# Patient Record
Sex: FEMALE | Race: WHITE | NOT HISPANIC OR LATINO | Employment: OTHER | URBAN - METROPOLITAN AREA
[De-identification: names, ages, dates, MRNs, and addresses within clinical notes are randomized per-mention and may not be internally consistent; named-entity substitution may affect disease eponyms.]

---

## 2018-09-14 ENCOUNTER — HOSPITAL ENCOUNTER (EMERGENCY)
Facility: HOSPITAL | Age: 58
Discharge: HOME | End: 2018-09-14
Attending: EMERGENCY MEDICINE | Admitting: EMERGENCY MEDICINE

## 2018-09-14 VITALS
WEIGHT: 130 LBS | SYSTOLIC BLOOD PRESSURE: 144 MMHG | HEART RATE: 78 BPM | DIASTOLIC BLOOD PRESSURE: 78 MMHG | OXYGEN SATURATION: 99 % | BODY MASS INDEX: 24.55 KG/M2 | RESPIRATION RATE: 16 BRPM | TEMPERATURE: 97.9 F | HEIGHT: 61 IN

## 2018-09-14 DIAGNOSIS — K13.0 ANGULAR CHEILOSIS: Primary | ICD-10-CM

## 2018-09-14 DIAGNOSIS — K14.0 GLOSSITIS: ICD-10-CM

## 2018-09-14 PROCEDURE — 99283 EMERGENCY DEPT VISIT LOW MDM: CPT

## 2018-09-14 RX ORDER — CLOTRIMAZOLE 10 MG/1
10 LOZENGE ORAL; TOPICAL
Qty: 50 TABLET | Refills: 0 | Status: ON HOLD | OUTPATIENT
Start: 2018-09-14 | End: 2021-07-01

## 2018-09-14 NOTE — ED PROVIDER NOTES
Subjective   Patient's been complaining of her tongue burning on both sides and in the back going on for 3 months.  Patient reports that she was initially given steroids which cause some white plaques in her mouth.  Patient states this was followed up with given some nystatin swish and spit which seemed to help a little bit and was given Diflucan to be taken every other day for 3 doses.  Patient reports this continues to come back.  She seen by her primary care doctor twice.  She was seen by a dermatologist for a possible biopsy which the dermatologist did not feel like she needed.  The dermatologist and sent her to an ear nose and throat doctor in the ENT due to some type of a swab for a fungal infection which will not be back for 6 weeks.  Patient reports that nothing seems to exacerbate the burning sensation she sometimes get this in the corners of her mouth as well.  Patient states that that I rechecked her B12 have done basic lab work which was all come back normal.  Her past history significant for a very superficial melanoma that was excised months ago.  Patient reports that that she has no other chronic medical disease problems.  Patient's that she's not had any bleeding of the tongue.  Said did not change her ability to taste.  She's had no injury to the mouth or to the head that she can recall.  Patient denies any fevers chills or rigors associated with this.  Patient does report the symptoms are severe enough that sometimes keeps her awake at night.  She did have one of the physicians that should seen suggested that she's become fixated on this but did not offer any other medications for this.  I think it seems to alleviate her discomfort is the nystatin swish and spit.  This is bilateral not unilateral.  She is had discomfort into her teeth but no specific tooth is tender.  She's also seen her dentist which did not feel like this was due to any other source other than taking too many antibiotics.   Sensation is a burning sensation.  States it is not radiate.  Patient does report cheilosis.        History provided by:  Patient and spouse   used: No        Review of Systems   Constitutional: Negative for chills and fever.   Respiratory: Negative for chest tightness, shortness of breath and wheezing.    Hematological: Negative for adenopathy.   Psychiatric/Behavioral: Negative.    All other systems reviewed and are negative.      Past Medical History:   Diagnosis Date   • Skin cancer        No Known Allergies    History reviewed. No pertinent surgical history.    History reviewed. No pertinent family history.    Social History     Social History   • Marital status:      Social History Main Topics   • Smoking status: Never Smoker   • Alcohol use No   • Drug use: No     Other Topics Concern   • Not on file           Objective   Physical Exam   Constitutional: She is oriented to person, place, and time. She appears well-developed and well-nourished.   HENT:   Head: Normocephalic and atraumatic.   Right Ear: External ear normal.   Left Ear: External ear normal.   Nose: Nose normal.   Mouth/Throat: Oropharynx is clear and moist.   Tongue appeared to be normal.  There is no glossitis that I can see.  Did not see any white patches at this time.   Eyes: Conjunctivae are normal. No scleral icterus.   Neck: Normal range of motion. No thyromegaly present.   Pulmonary/Chest: No respiratory distress.   Musculoskeletal: Normal range of motion.   Lymphadenopathy:     She has no cervical adenopathy.   Neurological: She is alert and oriented to person, place, and time. She has normal reflexes. She displays normal reflexes. No cranial nerve deficit. Coordination normal.   Skin: Skin is warm and dry.   Psychiatric: She has a normal mood and affect. Her behavior is normal. Judgment and thought content normal.   Nursing note and vitals reviewed.      Procedures           ED Course  ED Course as of Sep 15  2349   Fri Sep 14, 2018   0851 Patient did say that the oral asked that seemed to help afterwards it would seem to return.  Road try some oral clotrimazole and Magic mouthwash.  We'll for her to ENT on call Dr. Arshad.  [DAVID]      ED Course User Index  [DAVID] Wiley Lopez PA                  MDM  Number of Diagnoses or Management Options  Angular cheilosis: new and requires workup  Glossitis: new and requires workup     Amount and/or Complexity of Data Reviewed  Discuss the patient with other providers: yes    Patient Progress  Patient progress: stable        Final diagnoses:   Angular cheilosis   Glossitis            Wiley Lopez PA  09/15/18 0225

## 2021-03-16 ENCOUNTER — IMMUNIZATION (OUTPATIENT)
Dept: VACCINE CLINIC | Facility: HOSPITAL | Age: 61
End: 2021-03-16

## 2021-03-16 PROCEDURE — 91300 HC SARSCOV02 VAC 30MCG/0.3ML IM: CPT | Performed by: INTERNAL MEDICINE

## 2021-03-16 PROCEDURE — 0001A: CPT | Performed by: INTERNAL MEDICINE

## 2021-04-06 ENCOUNTER — IMMUNIZATION (OUTPATIENT)
Dept: VACCINE CLINIC | Facility: HOSPITAL | Age: 61
End: 2021-04-06

## 2021-04-06 PROCEDURE — 0002A: CPT | Performed by: INTERNAL MEDICINE

## 2021-04-06 PROCEDURE — 91300 HC SARSCOV02 VAC 30MCG/0.3ML IM: CPT | Performed by: INTERNAL MEDICINE

## 2021-07-01 ENCOUNTER — APPOINTMENT (OUTPATIENT)
Dept: GENERAL RADIOLOGY | Facility: HOSPITAL | Age: 61
End: 2021-07-01

## 2021-07-01 ENCOUNTER — APPOINTMENT (OUTPATIENT)
Dept: CT IMAGING | Facility: HOSPITAL | Age: 61
End: 2021-07-01

## 2021-07-01 ENCOUNTER — HOSPITAL ENCOUNTER (INPATIENT)
Facility: HOSPITAL | Age: 61
LOS: 2 days | Discharge: HOME | End: 2021-07-03
Attending: EMERGENCY MEDICINE | Admitting: INTERNAL MEDICINE

## 2021-07-01 DIAGNOSIS — R19.00 PELVIC MASS: ICD-10-CM

## 2021-07-01 DIAGNOSIS — D69.6 THROMBOCYTOPENIA (HCC): ICD-10-CM

## 2021-07-01 DIAGNOSIS — D72.819 LEUKOPENIA, UNSPECIFIED TYPE: Primary | ICD-10-CM
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
## 2021-07-01 LAB
ALBUMIN SERPL-MCNC: 4.01 G/DL (ref 3.5–5.2)
ALBUMIN/GLOB SERPL: 1.3 G/DL
ALP SERPL-CCNC: 84 U/L (ref 39–117)
ALT SERPL W P-5'-P-CCNC: 58 U/L (ref 1–33)
ANION GAP SERPL CALCULATED.3IONS-SCNC: 10.6 MMOL/L (ref 5–15)
AST SERPL-CCNC: 80 U/L (ref 1–32)
BACTERIA UR QL AUTO: ABNORMAL /HPF
BASOPHILS # BLD MANUAL: 0.03 10*3/MM3 (ref 0–0.2)
BASOPHILS NFR BLD AUTO: 1 % (ref 0–1.5)
BILIRUB SERPL-MCNC: 0.8 MG/DL (ref 0–1.2)
BILIRUB UR QL STRIP: ABNORMAL
BUN SERPL-MCNC: 12 MG/DL (ref 8–23)
BUN/CREAT SERPL: 14 (ref 7–25)
CALCIUM SPEC-SCNC: 9.2 MG/DL (ref 8.6–10.5)
CHLORIDE SERPL-SCNC: 95 MMOL/L (ref 98–107)
CLARITY UR: CLEAR
CO2 SERPL-SCNC: 25.4 MMOL/L (ref 22–29)
COLOR UR: ABNORMAL
CREAT SERPL-MCNC: 0.86 MG/DL (ref 0.57–1)
CRP SERPL-MCNC: 4.22 MG/DL (ref 0–0.5)
D-LACTATE SERPL-SCNC: 1.5 MMOL/L (ref 0.5–2)
DEPRECATED RDW RBC AUTO: 41.9 FL (ref 37–54)
ERYTHROCYTE [DISTWIDTH] IN BLOOD BY AUTOMATED COUNT: 13.4 % (ref 12.3–15.4)
FLUAV RNA RESP QL NAA+PROBE: NOT DETECTED
FLUBV RNA RESP QL NAA+PROBE: NOT DETECTED
GFR SERPL CREATININE-BSD FRML MDRD: 67 ML/MIN/1.73
GLOBULIN UR ELPH-MCNC: 3.1 GM/DL
GLUCOSE SERPL-MCNC: 113 MG/DL (ref 65–99)
GLUCOSE UR STRIP-MCNC: NEGATIVE MG/DL
HBA1C MFR BLD: 6.2 % (ref 4.8–5.6)
HCT VFR BLD AUTO: 40.3 % (ref 34–46.6)
HGB BLD-MCNC: 13.2 G/DL (ref 12–15.9)
HGB UR QL STRIP.AUTO: NEGATIVE
HYALINE CASTS UR QL AUTO: ABNORMAL /LPF
KETONES UR QL STRIP: ABNORMAL
LEUKOCYTE ESTERASE UR QL STRIP.AUTO: ABNORMAL
LYMPHOCYTES # BLD MANUAL: 0.36 10*3/MM3 (ref 0.7–3.1)
LYMPHOCYTES NFR BLD MANUAL: 10 % (ref 5–12)
LYMPHOCYTES NFR BLD MANUAL: 14 % (ref 19.6–45.3)
MAGNESIUM SERPL-MCNC: 1.9 MG/DL (ref 1.6–2.4)
MCH RBC QN AUTO: 28.1 PG (ref 26.6–33)
MCHC RBC AUTO-ENTMCNC: 32.8 G/DL (ref 31.5–35.7)
MCV RBC AUTO: 85.9 FL (ref 79–97)
METAMYELOCYTES NFR BLD MANUAL: 1 % (ref 0–0)
MONOCYTES # BLD AUTO: 0.26 10*3/MM3 (ref 0.1–0.9)
MUCOUS THREADS URNS QL MICRO: ABNORMAL /HPF
MYELOCYTES NFR BLD MANUAL: 2 % (ref 0–0)
NEUTROPHILS # BLD AUTO: 1.87 10*3/MM3 (ref 1.7–7)
NEUTROPHILS NFR BLD MANUAL: 60 % (ref 42.7–76)
NEUTS BAND NFR BLD MANUAL: 12 % (ref 0–5)
NITRITE UR QL STRIP: NEGATIVE
PH UR STRIP.AUTO: 6 [PH] (ref 5–8)
PLAT MORPH BLD: NORMAL
PLATELET # BLD AUTO: 76 10*3/MM3 (ref 140–450)
PMV BLD AUTO: 12.4 FL (ref 6–12)
POTASSIUM SERPL-SCNC: 3.1 MMOL/L (ref 3.5–5.2)
PROT SERPL-MCNC: 7.1 G/DL (ref 6–8.5)
PROT UR QL STRIP: ABNORMAL
RBC # BLD AUTO: 4.69 10*6/MM3 (ref 3.77–5.28)
RBC # UR: ABNORMAL /HPF
RBC MORPH BLD: NORMAL
REF LAB TEST METHOD: ABNORMAL
SARS-COV-2 RNA RESP QL NAA+PROBE: NOT DETECTED
SODIUM SERPL-SCNC: 131 MMOL/L (ref 136–145)
SP GR UR STRIP: 1.02 (ref 1–1.03)
SQUAMOUS #/AREA URNS HPF: ABNORMAL /HPF
TSH SERPL DL<=0.05 MIU/L-ACNC: 1.25 UIU/ML (ref 0.27–4.2)
UROBILINOGEN UR QL STRIP: ABNORMAL
WBC # BLD AUTO: 2.6 10*3/MM3 (ref 3.4–10.8)
WBC UR QL AUTO: ABNORMAL /HPF

## 2021-07-01 PROCEDURE — 87040 BLOOD CULTURE FOR BACTERIA: CPT | Performed by: EMERGENCY MEDICINE

## 2021-07-01 PROCEDURE — 85025 COMPLETE CBC W/AUTO DIFF WBC: CPT | Performed by: EMERGENCY MEDICINE

## 2021-07-01 PROCEDURE — 87636 SARSCOV2 & INF A&B AMP PRB: CPT | Performed by: PHYSICIAN ASSISTANT

## 2021-07-01 PROCEDURE — 85007 BL SMEAR W/DIFF WBC COUNT: CPT | Performed by: EMERGENCY MEDICINE

## 2021-07-01 PROCEDURE — 83036 HEMOGLOBIN GLYCOSYLATED A1C: CPT | Performed by: PHYSICIAN ASSISTANT

## 2021-07-01 PROCEDURE — 99223 1ST HOSP IP/OBS HIGH 75: CPT | Performed by: PHYSICIAN ASSISTANT

## 2021-07-01 PROCEDURE — 86753 PROTOZOA ANTIBODY NOS: CPT | Performed by: PHYSICIAN ASSISTANT

## 2021-07-01 PROCEDURE — 74176 CT ABD & PELVIS W/O CONTRAST: CPT

## 2021-07-01 PROCEDURE — 83605 ASSAY OF LACTIC ACID: CPT | Performed by: EMERGENCY MEDICINE

## 2021-07-01 PROCEDURE — 84443 ASSAY THYROID STIM HORMONE: CPT | Performed by: PHYSICIAN ASSISTANT

## 2021-07-01 PROCEDURE — 84145 PROCALCITONIN (PCT): CPT | Performed by: PHYSICIAN ASSISTANT

## 2021-07-01 PROCEDURE — 86666 EHRLICHIA ANTIBODY: CPT | Performed by: PHYSICIAN ASSISTANT

## 2021-07-01 PROCEDURE — 99285 EMERGENCY DEPT VISIT HI MDM: CPT

## 2021-07-01 PROCEDURE — 86618 LYME DISEASE ANTIBODY: CPT | Performed by: PHYSICIAN ASSISTANT

## 2021-07-01 PROCEDURE — 80053 COMPREHEN METABOLIC PANEL: CPT | Performed by: EMERGENCY MEDICINE

## 2021-07-01 PROCEDURE — 81001 URINALYSIS AUTO W/SCOPE: CPT | Performed by: EMERGENCY MEDICINE

## 2021-07-01 PROCEDURE — 25010000002 CEFTRIAXONE PER 250 MG: Performed by: EMERGENCY MEDICINE

## 2021-07-01 PROCEDURE — 71046 X-RAY EXAM CHEST 2 VIEWS: CPT

## 2021-07-01 PROCEDURE — 86757 RICKETTSIA ANTIBODY: CPT | Performed by: PHYSICIAN ASSISTANT

## 2021-07-01 PROCEDURE — 85060 BLOOD SMEAR INTERPRETATION: CPT | Performed by: PHYSICIAN ASSISTANT

## 2021-07-01 PROCEDURE — 71046 X-RAY EXAM CHEST 2 VIEWS: CPT | Performed by: RADIOLOGY

## 2021-07-01 PROCEDURE — 74176 CT ABD & PELVIS W/O CONTRAST: CPT | Performed by: RADIOLOGY

## 2021-07-01 PROCEDURE — 87086 URINE CULTURE/COLONY COUNT: CPT | Performed by: EMERGENCY MEDICINE

## 2021-07-01 PROCEDURE — 83735 ASSAY OF MAGNESIUM: CPT | Performed by: PHYSICIAN ASSISTANT

## 2021-07-01 PROCEDURE — 86140 C-REACTIVE PROTEIN: CPT | Performed by: PHYSICIAN ASSISTANT

## 2021-07-01 RX ORDER — SODIUM CHLORIDE 0.9 % (FLUSH) 0.9 %
10 SYRINGE (ML) INJECTION AS NEEDED
Status: DISCONTINUED | OUTPATIENT
Start: 2021-07-01 | End: 2021-07-03 | Stop reason: HOSPADM

## 2021-07-01 RX ORDER — DOXYCYCLINE 100 MG/1
100 CAPSULE ORAL ONCE
Status: COMPLETED | OUTPATIENT
Start: 2021-07-01 | End: 2021-07-01

## 2021-07-01 RX ORDER — SODIUM CHLORIDE 9 MG/ML
75 INJECTION, SOLUTION INTRAVENOUS CONTINUOUS
Status: DISCONTINUED | OUTPATIENT
Start: 2021-07-01 | End: 2021-07-03 | Stop reason: HOSPADM

## 2021-07-01 RX ORDER — CHOLECALCIFEROL (VITAMIN D3) 125 MCG
5 CAPSULE ORAL NIGHTLY PRN
Status: DISCONTINUED | OUTPATIENT
Start: 2021-07-01 | End: 2021-07-03 | Stop reason: HOSPADM

## 2021-07-01 RX ORDER — POTASSIUM CHLORIDE 20 MEQ/1
40 TABLET, EXTENDED RELEASE ORAL DAILY
Status: DISCONTINUED | OUTPATIENT
Start: 2021-07-01 | End: 2021-07-01

## 2021-07-01 RX ORDER — ALPRAZOLAM 0.5 MG/1
0.5 TABLET ORAL 2 TIMES DAILY PRN
COMMUNITY

## 2021-07-01 RX ORDER — ACETAMINOPHEN 325 MG/1
650 TABLET ORAL EVERY 6 HOURS PRN
Status: DISCONTINUED | OUTPATIENT
Start: 2021-07-01 | End: 2021-07-03 | Stop reason: HOSPADM

## 2021-07-01 RX ORDER — CEFDINIR 300 MG/1
300 CAPSULE ORAL 2 TIMES DAILY
COMMUNITY
End: 2021-07-03 | Stop reason: HOSPADM

## 2021-07-01 RX ORDER — PANTOPRAZOLE SODIUM 40 MG/1
40 TABLET, DELAYED RELEASE ORAL
Status: DISCONTINUED | OUTPATIENT
Start: 2021-07-02 | End: 2021-07-03 | Stop reason: HOSPADM

## 2021-07-01 RX ORDER — ROSUVASTATIN CALCIUM 10 MG/1
10 TABLET, COATED ORAL NIGHTLY
COMMUNITY

## 2021-07-01 RX ORDER — SODIUM CHLORIDE 0.9 % (FLUSH) 0.9 %
10 SYRINGE (ML) INJECTION EVERY 12 HOURS SCHEDULED
Status: DISCONTINUED | OUTPATIENT
Start: 2021-07-01 | End: 2021-07-03 | Stop reason: HOSPADM

## 2021-07-01 RX ORDER — IBUPROFEN 600 MG/1
600 TABLET ORAL EVERY 8 HOURS PRN
COMMUNITY

## 2021-07-01 RX ORDER — PANTOPRAZOLE SODIUM 40 MG/1
40 TABLET, DELAYED RELEASE ORAL DAILY
COMMUNITY

## 2021-07-01 RX ORDER — BACLOFEN 10 MG/1
10 TABLET ORAL 2 TIMES DAILY PRN
COMMUNITY

## 2021-07-01 RX ORDER — NITROGLYCERIN 0.4 MG/1
0.4 TABLET SUBLINGUAL
Status: DISCONTINUED | OUTPATIENT
Start: 2021-07-01 | End: 2021-07-03 | Stop reason: HOSPADM

## 2021-07-01 RX ORDER — L.ACID,PARA/B.BIFIDUM/S.THERM 8B CELL
1 CAPSULE ORAL DAILY
Status: DISCONTINUED | OUTPATIENT
Start: 2021-07-01 | End: 2021-07-03 | Stop reason: HOSPADM

## 2021-07-01 RX ADMIN — DOXYCYCLINE 100 MG: 100 CAPSULE ORAL at 17:09

## 2021-07-01 RX ADMIN — ACETAMINOPHEN 650 MG: 325 TABLET ORAL at 19:34

## 2021-07-01 RX ADMIN — SODIUM CHLORIDE 75 ML/HR: 9 INJECTION, SOLUTION INTRAVENOUS at 19:27

## 2021-07-01 RX ADMIN — CEFTRIAXONE 1 G: 1 INJECTION, POWDER, FOR SOLUTION INTRAMUSCULAR; INTRAVENOUS at 16:11

## 2021-07-01 RX ADMIN — SODIUM CHLORIDE 1000 ML: 9 INJECTION, SOLUTION INTRAVENOUS at 09:40

## 2021-07-01 RX ADMIN — POTASSIUM CHLORIDE 40 MEQ: 20 TABLET, EXTENDED RELEASE ORAL at 12:25

## 2021-07-01 NOTE — ED PROVIDER NOTES
"Subjective   60-year-old white female complains of being \"sick\".  Patient states that 5 days ago she began to have symptoms of \"hot flashes\" intermittently.  She also complains of diffuse myalgias, cough with scant sputum production.  She also complained of dysuria and said that her urine appeared \"copper colored\" yesterday and this morning.  She saw immediate care yesterday and got prescription for cefdinir for UTI.  However, today her symptoms continued.  She has had decreased appetite and decreased fluid intake and complains of generalized weakness and malaise.  She denies any medicine allergies, tobacco, alcohol or drug use.  Patient reports tick bite on her left ankle 3 weeks ago.          Review of Systems   All other systems reviewed and are negative.      Past Medical History:   Diagnosis Date   • Skin cancer        No Known Allergies    No past surgical history on file.    No family history on file.    Social History     Socioeconomic History   • Marital status:      Spouse name: Not on file   • Number of children: Not on file   • Years of education: Not on file   • Highest education level: Not on file   Tobacco Use   • Smoking status: Never Smoker   Substance and Sexual Activity   • Alcohol use: No   • Drug use: No           Objective   Physical Exam  Vitals and nursing note reviewed. Exam conducted with a chaperone present (Priscilla).   Constitutional:       Appearance: Normal appearance. She is normal weight.   HENT:      Head: Normocephalic and atraumatic.      Mouth/Throat:      Mouth: Mucous membranes are moist.      Pharynx: Oropharynx is clear.   Cardiovascular:      Rate and Rhythm: Normal rate and regular rhythm.      Heart sounds: Normal heart sounds. No murmur heard.   No friction rub. No gallop.    Pulmonary:      Effort: Pulmonary effort is normal. No respiratory distress.      Breath sounds: Normal breath sounds. No wheezing, rhonchi or rales.   Abdominal:      General: Abdomen is flat. " Bowel sounds are normal. There is no distension.      Palpations: Abdomen is soft.      Tenderness: There is no abdominal tenderness. There is no guarding.      Hernia: No hernia is present.   Musculoskeletal:         General: Normal range of motion.   Skin:     General: Skin is warm and dry.      Comments: No signs of tick bite at this time.   Neurological:      General: No focal deficit present.      Mental Status: She is alert and oriented to person, place, and time.   Psychiatric:         Mood and Affect: Mood normal.         Behavior: Behavior normal.         Procedures           ED Course      Results for orders placed or performed during the hospital encounter of 07/01/21   Comprehensive Metabolic Panel    Specimen: Blood   Result Value Ref Range    Glucose 113 (H) 65 - 99 mg/dL    BUN 12 8 - 23 mg/dL    Creatinine 0.86 0.57 - 1.00 mg/dL    Sodium 131 (L) 136 - 145 mmol/L    Potassium 3.1 (L) 3.5 - 5.2 mmol/L    Chloride 95 (L) 98 - 107 mmol/L    CO2 25.4 22.0 - 29.0 mmol/L    Calcium 9.2 8.6 - 10.5 mg/dL    Total Protein 7.1 6.0 - 8.5 g/dL    Albumin 4.01 3.50 - 5.20 g/dL    ALT (SGPT) 58 (H) 1 - 33 U/L    AST (SGOT) 80 (H) 1 - 32 U/L    Alkaline Phosphatase 84 39 - 117 U/L    Total Bilirubin 0.8 0.0 - 1.2 mg/dL    eGFR Non African Amer 67 >60 mL/min/1.73    Globulin 3.1 gm/dL    A/G Ratio 1.3 g/dL    BUN/Creatinine Ratio 14.0 7.0 - 25.0    Anion Gap 10.6 5.0 - 15.0 mmol/L   Urinalysis With Culture If Indicated - Urine, Clean Catch    Specimen: Urine, Clean Catch   Result Value Ref Range    Color, UA Dark Yellow (A) Yellow, Straw    Appearance, UA Clear Clear    pH, UA 6.0 5.0 - 8.0    Specific Gravity, UA 1.024 1.005 - 1.030    Glucose, UA Negative Negative    Ketones, UA Trace (A) Negative    Bilirubin, UA Small (1+) (A) Negative    Blood, UA Negative Negative    Protein,  mg/dL (2+) (A) Negative    Leuk Esterase, UA Small (1+) (A) Negative    Nitrite, UA Negative Negative    Urobilinogen, UA 1.0  E.U./dL 0.2 - 1.0 E.U./dL   CBC Auto Differential    Specimen: Blood   Result Value Ref Range    WBC 2.60 (L) 3.40 - 10.80 10*3/mm3    RBC 4.69 3.77 - 5.28 10*6/mm3    Hemoglobin 13.2 12.0 - 15.9 g/dL    Hematocrit 40.3 34.0 - 46.6 %    MCV 85.9 79.0 - 97.0 fL    MCH 28.1 26.6 - 33.0 pg    MCHC 32.8 31.5 - 35.7 g/dL    RDW 13.4 12.3 - 15.4 %    RDW-SD 41.9 37.0 - 54.0 fl    MPV 12.4 (H) 6.0 - 12.0 fL    Platelets 76 (L) 140 - 450 10*3/mm3   Lactic Acid, Plasma    Specimen: Arm, Right; Blood   Result Value Ref Range    Lactate 1.5 0.5 - 2.0 mmol/L   Urinalysis, Microscopic Only - Urine, Clean Catch    Specimen: Urine, Clean Catch   Result Value Ref Range    RBC, UA 0-2 None Seen, 0-2 /HPF    WBC, UA 6-12 (A) None Seen, 0-2 /HPF    Bacteria, UA 1+ (A) None Seen /HPF    Squamous Epithelial Cells, UA 7-12 (A) None Seen, 0-2 /HPF    Hyaline Casts, UA None Seen None Seen /LPF    Mucus, UA Large/3+ (A) None Seen, Trace /HPF    Methodology Manual Light Microscopy    Manual Differential    Specimen: Blood   Result Value Ref Range    Neutrophil % 60.0 42.7 - 76.0 %    Lymphocyte % 14.0 (L) 19.6 - 45.3 %    Monocyte % 10.0 5.0 - 12.0 %    Basophil % 1.0 0.0 - 1.5 %    Bands %  12.0 (H) 0.0 - 5.0 %    Metamyelocyte % 1.0 (H) 0.0 - 0.0 %    Myelocyte % 2.0 (H) 0.0 - 0.0 %    Neutrophils Absolute 1.87 1.70 - 7.00 10*3/mm3    Lymphocytes Absolute 0.36 (L) 0.70 - 3.10 10*3/mm3    Monocytes Absolute 0.26 0.10 - 0.90 10*3/mm3    Basophils Absolute 0.03 0.00 - 0.20 10*3/mm3    RBC Morphology Normal Normal    Platelet Morphology Normal Normal     XR Chest 2 View    Result Date: 7/1/2021  Narrative: EXAM:   XR Chest, 2 Views  EXAM DATE:   7/1/2021 10:25 AM  CLINICAL HISTORY:   chills  TECHNIQUE:   Frontal and lateral views of the chest.  COMPARISON:   No relevant prior studies available.  FINDINGS:   LUNGS:  Unremarkable.  No consolidation.   PLEURAL SPACE:  Unremarkable.  No pneumothorax.   HEART:  Unremarkable.  No cardiomegaly.    MEDIASTINUM:  Unremarkable.   BONES/JOINTS:  Unremarkable.      Impression:   No acute findings in the chest.  This report was finalized on 7/1/2021 10:38 AM by Dr. Rc Cheung MD.      CT Abdomen Pelvis Stone Protocol    Result Date: 7/1/2021  Narrative: EXAM:   CT Abdomen and Pelvis Without Intravenous Contrast  EXAM DATE:   7/1/2021 10:14 AM  CLINICAL HISTORY:   Flank pain, kidney stone suspected  TECHNIQUE:   Axial computed tomography images of the abdomen and pelvis without intravenous contrast.  Sagittal and coronal reformatted images were created and reviewed.  This CT exam was performed using one or more of the following dose reduction techniques:  automated exposure control, adjustment of the mA and/or kV according to patient size, and/or use of iterative reconstruction technique.  COMPARISON:   No relevant prior studies available.  FINDINGS:   LUNG BASES:  Unremarkable.  No mass.  No consolidation.   ABDOMEN:   LIVER:  Unremarkable.   GALLBLADDER AND BILE DUCTS:  Unremarkable.  No calcified stones.  No ductal dilation.   PANCREAS:  Unremarkable.  No ductal dilation.   SPLEEN:  Unremarkable.  No splenomegaly.   ADRENALS:  Unremarkable.  No mass.   KIDNEYS AND URETERS:  Nonobstructive bilateral renal calculi noted.   STOMACH AND BOWEL:  Unremarkable.  No obstruction.  No mucosal thickening.   PELVIS:   APPENDIX:  No findings to suggest acute appendicitis.   BLADDER:  Unremarkable.  No stones.   REPRODUCTIVE:  3.4 cm left adnexal region mass.   ABDOMEN and PELVIS:   INTRAPERITONEAL SPACE:  Unremarkable.  No free air.  No significant fluid collection.   BONES/JOINTS:  No acute fracture.  No dislocation.   SOFT TISSUES:  Unremarkable.   VASCULATURE:  Unremarkable.  No abdominal aortic aneurysm.   LYMPH NODES:  Unremarkable.  No enlarged lymph nodes.      Impression: 1.  Nonobstructive bilateral renal calculi noted. 2.  3.4 cm left adnexal region mass.  This report was finalized on 7/1/2021 10:20 AM by   Rc Cheung MD.                                             MDM  Number of Diagnoses or Management Options     Amount and/or Complexity of Data Reviewed  Clinical lab tests: reviewed  Tests in the radiology section of CPT®: reviewed    Risk of Complications, Morbidity, and/or Mortality  Presenting problems: high  Diagnostic procedures: high  Management options: high        Final diagnoses:   Leukopenia, unspecified type   Thrombocytopenia (CMS/HCC)   Pelvic mass       ED Disposition  ED Disposition     ED Disposition Condition Comment    Decision to Admit            No follow-up provider specified.       Medication List      No changes were made to your prescriptions during this visit.          Gorge Kraus MD  07/01/21 1754

## 2021-07-01 NOTE — H&P
"     [facility] Medicine Services  HISTORY & PHYSICAL    Patient Identification:  Name:  Merle Granados  Age:  60 y.o.  Sex:  female  :  1960  MRN:  [id number]   Visit Number:  [id number]  Admit Date: 2021   Primary Care Physician:  Jakub Triana APRN     Subjective     Chief complaint:   Chief Complaint   Patient presents with   • Fever   • Generalized Body Aches     History of presenting illness:   Patient is a 60 y.o. female with past medical history significant for GERD anxiety that presented to the [facility] emergency department for evaluation of overall unwell feeling.  Patient states onset of symptoms was approximately 4 days ago.  Patient states she started with nausea and vomiting.  She states that 3 days ago she woke up and was unable to urinate and had difficulty urinating.  Patient states she has also been experiencing ongoing subjective fevers, chills, \" hot flashes\", headaches, arthralgias and myalgias.  Patient states she has had poor oral intake, she states she has been trying to drink plenty of fluids but has had no appetite.  She states when she does not eat, she coughs and reports an ongoing cough that is nonproductive.  She reports ongoing generalized weakness and malaise, fatigue.  Patient states that she was seen at urgent care yesterday and was diagnosed with a urinary tract infection based on urine sample, she was sent home with a prescription for Omnicef without improvement of her symptoms.  No further diarrhea or vomiting.  She states diarrhea and vomiting was only for 1 day.  Upon further questioning, she does report a tick bite of her left ankle approximately 3 weeks ago, denies any rashes.  She states that prior to onset of illness, she was at her baseline state of health.  She states she is pretty healthy with no ongoing medical problems.  She did have melanoma in 2017 status post surgical incision and did not require any radiation or " "chemotherapy.  She states she does get urinary tract infections frequently especially in the summer, she states she tries to stay hydrated.  Upon further questioning, she does report being menopausal since age 46.  She states she had a her last GYN exam several months ago at [facility] and was told her Pap smear result is normal.  She denies any vaginal bleeding but does report some discolored vaginal discharge over the past 3 weeks, states it is \" copper colored\".  She denies any abdominal pain.  She does report a 9 pound weight loss over the last couple of weeks, reports secondary to decreased oral intake.  She denies having any known abnormalities on previous lab work such as leukopenia or thrombocytopenia.  Denies any liver issues in the past.  She denies any chest pain or dyspnea.  No upper respiratory symptoms.  No neck pain or nuchal rigidity.    Upon arrival to the ED, vitals were temperature 99.1, heart rate 117, respiratory rate 18, blood pressure 147/76, oxygen saturation 99% on room air.  CMP with glucose 113, sodium 131, potassium 3.1, ALT 58, and AST 80.  Lactic acid 1.5.  CBC with white blood cell count 2.6, platelets 76, bands 12%, absolute neutrophil count 1.87.  Urinalysis with dark yellow appearance, trace ketones, 1+ leukocytes, 2+ protein, 1+ bilirubin, 6-12 WBC, 1+ bacteria, 3+ mucus, and 7-12 squamous epithelial cells.  Covid 19 and influenza screening negative.  Chest x-ray with no evidence of acute cardiopulmonary disease.  CT abdomen pelvis stone protocol with nonobstructive bilateral renal calculi noted.  There is also mention of a 3.4 cm left adnexal region mass.  While emergency department, patient was administered 1 g IV Rocephin, 100 mg IV doxycycline, and a 1 L bolus normal saline.    Patient has been admitted to the medical surgical floor for further evaluation and treatment.    Present during exam: N/A "   ---------------------------------------------------------------------------------------------------------------------   Review of Systems   Constitutional: Positive for activity change, appetite change, chills, diaphoresis, fatigue and fever.   HENT: Negative for congestion, postnasal drip, rhinorrhea, sinus pain, sore throat and trouble swallowing.    Eyes: Negative for discharge and visual disturbance.   Respiratory: Positive for shortness of breath. Negative for cough, chest tightness and wheezing.    Cardiovascular: Negative for chest pain, palpitations and leg swelling.   Gastrointestinal: Positive for diarrhea, nausea and vomiting. Negative for abdominal pain and constipation.   Endocrine: Negative for cold intolerance and heat intolerance.   Genitourinary: Positive for difficulty urinating. Negative for decreased urine volume, dysuria, flank pain, frequency and urgency.        Dark urine  Discolored vaginal discharge   Musculoskeletal: Positive for arthralgias and myalgias. Negative for gait problem.   Skin: Negative for rash and wound.   Allergic/Immunologic: Negative for environmental allergies and immunocompromised state.   Neurological: Positive for weakness and headaches. Negative for dizziness and syncope.   Hematological: Negative for adenopathy. Does not bruise/bleed easily.   Psychiatric/Behavioral: Negative for confusion. The patient is not nervous/anxious.       ---------------------------------------------------------------------------------------------------------------------   Past Medical History:   Diagnosis Date   • Skin cancer      No past surgical history on file.  No family history on file.  Social History     Socioeconomic History   • Marital status:      Spouse name: Not on file   • Number of children: Not on file   • Years of education: Not on file   • Highest education level: Not on file   Tobacco Use   • Smoking status: Never Smoker   Substance and Sexual Activity   • Alcohol  use: No   • Drug use: No     ---------------------------------------------------------------------------------------------------------------------   Allergies:  Patient has no known allergies.  ---------------------------------------------------------------------------------------------------------------------   Medications below are reported home medications pulling from within the system; at this time, these medications have not been reconciled unless otherwise specified and are in the verification process for further verifcation as current home medications.    Prior to Admission Medications     Prescriptions Last Dose Informant Patient Reported? Taking?    ALPRAZolam (XANAX) 0.5 MG tablet   Yes Yes    Take 0.5 mg by mouth 2 (Two) Times a Day As Needed for Anxiety.    cefdinir (OMNICEF) 300 MG capsule   Yes Yes    Take 300 mg by mouth 2 (Two) Times a Day. UTI S/S    pantoprazole (PROTONIX) 40 MG EC tablet   Yes Yes    Take 40 mg by mouth Daily.    clotrimazole (MYCELEX) 10 MG gilberto   No No    Take 1 tablet by mouth 5 (Five) Times a Day.        ---------------------------------------------------------------------------------------------------------------------    Objective     Hospital Scheduled Meds:  potassium chloride, 40 mEq, Oral, Daily         Current listed hospital scheduled medications may not yet reflect those currently placed in orders that are signed and held, awaiting patient's arrival to floor/unit.    ---------------------------------------------------------------------------------------------------------------------   Vital Signs:  Temp:  [99.1 °F (37.3 °C)] 99.1 °F (37.3 °C)  Heart Rate:  [] 101  Resp:  [16-18] 16  BP: (114-148)/(68-91) 148/72  Mean Arterial Pressure (Non-Invasive) for the past 24 hrs (Last 3 readings):   Noninvasive MAP (mmHg)   07/01/21 1818 98   07/01/21 1803 89   07/01/21 1747 88     SpO2 Percentage    07/01/21 1747 07/01/21 1803 07/01/21 1818   SpO2: 96% 97% 96%      SpO2:  [93 %-100 %] 96 %  on   ;   Device (Oxygen Therapy): room air    Body mass index is 24.56 kg/m².  Wt Readings from Last 3 Encounters:   07/01/21 59 kg (130 lb)   09/14/18 59 kg (130 lb)       ---------------------------------------------------------------------------------------------------------------------   Physical Exam:  Physical Exam  Nursing note reviewed.   Constitutional:       General: She is awake. She is not in acute distress.     Appearance: She is well-developed. She is not toxic-appearing.      Comments: Sitting up on ED stretcher, no acute distress noted.  On room air.  No family present at bedside.   HENT:      Head: Normocephalic and atraumatic.      Mouth/Throat:      Mouth: Mucous membranes are moist.      Pharynx: Oropharynx is clear.   Eyes:      Extraocular Movements: Extraocular movements intact.      Conjunctiva/sclera: Conjunctivae normal.      Pupils: Pupils are equal, round, and reactive to light.   Neck:      Vascular: No carotid bruit.   Cardiovascular:      Rate and Rhythm: Regular rhythm. Tachycardia present.      Pulses:           Posterior tibial pulses are 2+ on the right side and 2+ on the left side.      Heart sounds: Normal heart sounds. No murmur heard.   No friction rub. No gallop.    Pulmonary:      Effort: Pulmonary effort is normal. No tachypnea, accessory muscle usage or respiratory distress.      Breath sounds: Normal breath sounds and air entry. No wheezing, rhonchi or rales.      Comments: Speaks in full sentences without dyspnea, on room air.  Chest:      Chest wall: No tenderness.   Abdominal:      General: Bowel sounds are normal. There is no distension.      Palpations: Abdomen is soft. There is no hepatomegaly, splenomegaly or mass.      Tenderness: There is no abdominal tenderness. There is no guarding or rebound.   Genitourinary:     Comments: No lopez catheter in place.  Musculoskeletal:      Cervical back: Normal range of motion and neck supple.       Right lower leg: No edema.      Left lower leg: No edema.   Skin:     General: Skin is warm and dry.      Capillary Refill: Capillary refill takes less than 2 seconds.      Findings: No lesion or wound.      Comments: No rashes noted   Neurological:      General: No focal deficit present.      Mental Status: She is alert and oriented to person, place, and time.      Cranial Nerves: Cranial nerves are intact.      Sensory: Sensation is intact.      Motor: Motor function is intact.      Comments: Awake and alert. Follows commands. Answers questions appropriately. Moves all extremities equally. Strength and sensation intact. No focal neuro deficit on exam.   Psychiatric:         Attention and Perception: Attention normal.         Mood and Affect: Mood and affect normal.         Speech: Speech normal.         Behavior: Behavior normal. Behavior is cooperative.         Thought Content: Thought content normal.         Cognition and Memory: Cognition and memory normal.         Judgment: Judgment normal.       ---------------------------------------------------------------------------------------------------------------------  EKG:    No EKG on file, will obtain baseline EKG and review once available     Telemetry:    Patient not currently on telemetry monitoring    I have personally reviewed the EKG/Telemetry strip  ---------------------------------------------------------------------------------------------------------------------             Results from last 7 days   Lab Units 07/01/21  1007 07/01/21  0938   LACTATE mmol/L 1.5  --    WBC 10*3/mm3  --  2.60*   HEMOGLOBIN g/dL  --  13.2   HEMATOCRIT %  --  40.3   MCV fL  --  85.9   MCHC g/dL  --  32.8   PLATELETS 10*3/mm3  --  76*     Results from last 7 days   Lab Units 07/01/21  0938   SODIUM mmol/L 131*   POTASSIUM mmol/L 3.1*   CHLORIDE mmol/L 95*   CO2 mmol/L 25.4   BUN mg/dL 12   CREATININE mg/dL 0.86   EGFR IF NONAFRICN AM mL/min/1.73 67   CALCIUM mg/dL 9.2    GLUCOSE mg/dL 113*   ALBUMIN g/dL 4.01   BILIRUBIN mg/dL 0.8   ALK PHOS U/L 84   AST (SGOT) U/L 80*   ALT (SGPT) U/L 58*   Estimated Creatinine Clearance: 56.7 mL/min (by C-G formula based on SCr of 0.86 mg/dL).    Microbiology Results (last 10 days)     ** No results found for the last 240 hours. **         I have personally reviewed the above laboratory results.   ---------------------------------------------------------------------------------------------------------------------  Imaging Results (Last 7 Days)     Procedure Component Value Units Date/Time    XR Chest 2 View [[id number]] Collected: 07/01/21 1038     Updated: 07/01/21 1045    Narrative:      EXAM:    XR Chest, 2 Views     EXAM DATE:    7/1/2021 10:25 AM     CLINICAL HISTORY:    chills     TECHNIQUE:    Frontal and lateral views of the chest.     COMPARISON:    No relevant prior studies available.     FINDINGS:    LUNGS:  Unremarkable.  No consolidation.    PLEURAL SPACE:  Unremarkable.  No pneumothorax.    HEART:  Unremarkable.  No cardiomegaly.    MEDIASTINUM:  Unremarkable.    BONES/JOINTS:  Unremarkable.       Impression:        No acute findings in the chest.     This report was finalized on 7/1/2021 10:38 AM by Dr. Rc Cheung MD.       CT Abdomen Pelvis Stone Protocol [[id number]] Collected: 07/01/21 1019     Updated: 07/01/21 1030    Narrative:      EXAM:    CT Abdomen and Pelvis Without Intravenous Contrast     EXAM DATE:    7/1/2021 10:14 AM     CLINICAL HISTORY:    Flank pain, kidney stone suspected     TECHNIQUE:    Axial computed tomography images of the abdomen and pelvis without  intravenous contrast.  Sagittal and coronal reformatted images were  created and reviewed.  This CT exam was performed using one or more of  the following dose reduction techniques:  automated exposure control,  adjustment of the mA and/or kV according to patient size, and/or use of  iterative reconstruction technique.     COMPARISON:    No relevant prior  studies available.     FINDINGS:    LUNG BASES:  Unremarkable.  No mass.  No consolidation.      ABDOMEN:    LIVER:  Unremarkable.    GALLBLADDER AND BILE DUCTS:  Unremarkable.  No calcified stones.  No  ductal dilation.    PANCREAS:  Unremarkable.  No ductal dilation.    SPLEEN:  Unremarkable.  No splenomegaly.    ADRENALS:  Unremarkable.  No mass.    KIDNEYS AND URETERS:  Nonobstructive bilateral renal calculi noted.    STOMACH AND BOWEL:  Unremarkable.  No obstruction.  No mucosal  thickening.      PELVIS:    APPENDIX:  No findings to suggest acute appendicitis.    BLADDER:  Unremarkable.  No stones.    REPRODUCTIVE:  3.4 cm left adnexal region mass.      ABDOMEN and PELVIS:    INTRAPERITONEAL SPACE:  Unremarkable.  No free air.  No significant  fluid collection.    BONES/JOINTS:  No acute fracture.  No dislocation.    SOFT TISSUES:  Unremarkable.    VASCULATURE:  Unremarkable.  No abdominal aortic aneurysm.    LYMPH NODES:  Unremarkable.  No enlarged lymph nodes.       Impression:      1.  Nonobstructive bilateral renal calculi noted.  2.  3.4 cm left adnexal region mass.     This report was finalized on 7/1/2021 10:20 AM by Dr. Rc Cheung MD.         I have personally reviewed the above radiology results.   ---------------------------------------------------------------------------------------------------------------------    Assessment & Plan      -Sepsis, present on admission, secondary to presumed tickborne illness  -Recent tick bite  -Leukopenia  -Bandemia  -Thrombocytopenia  -Transaminitis  • Patient met sepsis criteria on admission with heart rate greater than 100, leukopenia, and bandemia  • Blood and urine cultures obtained in ED, follow for final results  • Obtain tickborne panel  • Obtain procalcitonin level as well as CRP, lactic acid not elevated  • Continue.  Treatment with IV doxycycline.  Lactobacillus for gut stan protection  • As needed Tylenol, monitor temperature curve closely  • Monitor  vitals closely  • Obtain peripheral blood smear for thrombocytopenia and leukopenia  • Repeat CBC and CMP in a.m.    -Possible urinary tract infection  · Patient reports difficulty urinating but no other urinary complaints  · She was recently started on Omnicef for a urinary tract infection  · Urinalysis reviewed however slightly poor sample  · Urine culture pending  · Monitor urine output closely    -Elevated LFTs  · Felt to be due to underlying possible tickborne illness  · Obtain viral hepatitis panel  · Avoid hepatotoxins as much as possible  · Repeat CMP in AM    -3.4 cm left adnexal mass  -Report of discolored vaginal discharge  -Postmenopausal  · Weight loss 10 lbs in past week, hot flashes  · Obtain transvaginal ultrasound  · GYN consult, input/assistance is much appreciated  · She reports last Pap smear within the past several months and reports normal findings    -Hypokalemia  · Obtain magnesium level  · Replace per protocol as necessary  · Telemetry monitoring  · Repeat chemistry panel magnesium level in a.m.    -Hyponatremia  -Hypochloremia  · Likely hypovolemic  · Continue gentle IV fluids  · Repeat chemistry panel in a.m.    -Decreased oral intake  · Replace electrolytes per protocol as necessary  · IV fluids    -GERD  • PPI    -Anxiety   · Supportive care  · Resume home medication once reconciled per pharmacy    -History of melanoma 2017  · S/P excision   · Continue outpatient monitoring     -F/E/N  • Gentle IV fluids.  Replace electrolytes per protocol as necessary.  Regular diet.    ---------------------------------------------------  DVT Prophylaxis: Lovenox   GI Prophylaxis: PPI  Activity: Up with assistance as tolerated     The patient is considered to be a high risk patient due to: Sepsis, tick borne illness, thrombocytopenia, leukopenia, bandemia, transaminitis, hypokalemia, electrolyte abnormalities, decreased oral intake    INPATIENT status due to the need for care which can only be  reasonably provided in an hospital setting such as aggressive/expedited ancillary services and/or consultation services, the necessity for IV medications, close physician monitoring and/or the possible need for procedures.  In such, I feel patient’s risk for adverse outcomes and need for care warrant INPATIENT evaluation and predict the patient’s care encounter to likely last beyond 2 midnights.    Code Status: FULL CODE     Disposition/Discharge planning: Plans on home at discharge     I have discussed the patient's assessment and plan with the patient, nursing staff, and attending physician DO Shayla Wiley Dr., PA-C  Hospitalist Service -- [facility]   Pager: [phone]    07/01/21  18:48 EDT    Attending Physician: Dr. Rinaldi,        ---------------------------------------------------------------------------------------------------------------------

## 2021-07-02 ENCOUNTER — APPOINTMENT (OUTPATIENT)
Dept: ULTRASOUND IMAGING | Facility: HOSPITAL | Age: 61
End: 2021-07-02

## 2021-07-02 LAB
ALBUMIN SERPL-MCNC: 3.1 G/DL (ref 3.5–5.2)
ALBUMIN/GLOB SERPL: 1.2 G/DL
ALP SERPL-CCNC: 72 U/L (ref 39–117)
ALT SERPL W P-5'-P-CCNC: 51 U/L (ref 1–33)
ANION GAP SERPL CALCULATED.3IONS-SCNC: 6.3 MMOL/L (ref 5–15)
AST SERPL-CCNC: 67 U/L (ref 1–32)
BACTERIA SPEC AEROBE CULT: NO GROWTH
BASOPHILS # BLD AUTO: 0.02 10*3/MM3 (ref 0–0.2)
BASOPHILS NFR BLD AUTO: 1 % (ref 0–1.5)
BILIRUB SERPL-MCNC: 0.4 MG/DL (ref 0–1.2)
BUN SERPL-MCNC: 9 MG/DL (ref 8–23)
BUN/CREAT SERPL: 14.5 (ref 7–25)
CALCIUM SPEC-SCNC: 8.5 MG/DL (ref 8.6–10.5)
CANCER AG125 SERPL QL: 18.8 U/ML (ref 0–38.1)
CHLORIDE SERPL-SCNC: 107 MMOL/L (ref 98–107)
CO2 SERPL-SCNC: 24.7 MMOL/L (ref 22–29)
CREAT SERPL-MCNC: 0.62 MG/DL (ref 0.57–1)
DEPRECATED RDW RBC AUTO: 42.7 FL (ref 37–54)
EOSINOPHIL # BLD AUTO: 0.02 10*3/MM3 (ref 0–0.4)
EOSINOPHIL NFR BLD AUTO: 1 % (ref 0.3–6.2)
ERYTHROCYTE [DISTWIDTH] IN BLOOD BY AUTOMATED COUNT: 13.5 % (ref 12.3–15.4)
FERRITIN SERPL-MCNC: 937.7 NG/ML (ref 13–150)
GFR SERPL CREATININE-BSD FRML MDRD: 98 ML/MIN/1.73
GLOBULIN UR ELPH-MCNC: 2.5 GM/DL
GLUCOSE SERPL-MCNC: 95 MG/DL (ref 65–99)
HAPTOGLOB SERPL-MCNC: <10 MG/DL (ref 30–200)
HAV IGM SERPL QL IA: NORMAL
HBV CORE IGM SERPL QL IA: NORMAL
HBV SURFACE AG SERPL QL IA: NORMAL
HCT VFR BLD AUTO: 32.6 % (ref 34–46.6)
HCV AB SER DONR QL: NORMAL
HGB BLD-MCNC: 10.5 G/DL (ref 12–15.9)
HIV1+2 AB SER QL: NORMAL
HYPOCHROMIA BLD QL: NORMAL
IMM GRANULOCYTES # BLD AUTO: 0.08 10*3/MM3 (ref 0–0.05)
IMM GRANULOCYTES NFR BLD AUTO: 4.1 % (ref 0–0.5)
INR PPP: 1.02 (ref 0.9–1.1)
IRON 24H UR-MRATE: 22 MCG/DL (ref 37–145)
IRON SATN MFR SERPL: 8 % (ref 20–50)
LARGE PLATELETS: NORMAL
LDH SERPL-CCNC: 544 U/L (ref 135–214)
LYMPHOCYTES # BLD AUTO: 0.76 10*3/MM3 (ref 0.7–3.1)
LYMPHOCYTES NFR BLD AUTO: 38.6 % (ref 19.6–45.3)
MAGNESIUM SERPL-MCNC: 1.9 MG/DL (ref 1.6–2.4)
MCH RBC QN AUTO: 27.9 PG (ref 26.6–33)
MCHC RBC AUTO-ENTMCNC: 32.2 G/DL (ref 31.5–35.7)
MCV RBC AUTO: 86.5 FL (ref 79–97)
MONOCYTES # BLD AUTO: 0.23 10*3/MM3 (ref 0.1–0.9)
MONOCYTES NFR BLD AUTO: 11.7 % (ref 5–12)
NEUTROPHILS NFR BLD AUTO: 0.86 10*3/MM3 (ref 1.7–7)
NEUTROPHILS NFR BLD AUTO: 43.6 % (ref 42.7–76)
NRBC BLD AUTO-RTO: 0 /100 WBC (ref 0–0.2)
PHOSPHATE SERPL-MCNC: 3 MG/DL (ref 2.5–4.5)
PLATELET # BLD AUTO: 52 10*3/MM3 (ref 140–450)
PMV BLD AUTO: 12.4 FL (ref 6–12)
POTASSIUM SERPL-SCNC: 3.7 MMOL/L (ref 3.5–5.2)
PROCALCITONIN SERPL-MCNC: 0.24 NG/ML (ref 0–0.25)
PROT SERPL-MCNC: 5.6 G/DL (ref 6–8.5)
PROTHROMBIN TIME: 13.8 SECONDS (ref 12.8–14.5)
RBC # BLD AUTO: 3.77 10*6/MM3 (ref 3.77–5.28)
RETICS # AUTO: 0.04 10*6/MM3 (ref 0.02–0.13)
RETICS/RBC NFR AUTO: 1.05 % (ref 0.7–1.9)
SMALL PLATELETS BLD QL SMEAR: NORMAL
SODIUM SERPL-SCNC: 138 MMOL/L (ref 136–145)
TIBC SERPL-MCNC: 289 MCG/DL (ref 298–536)
TRANSFERRIN SERPL-MCNC: 194 MG/DL (ref 200–360)
WBC # BLD AUTO: 1.97 10*3/MM3 (ref 3.4–10.8)

## 2021-07-02 PROCEDURE — 82746 ASSAY OF FOLIC ACID SERUM: CPT | Performed by: NURSE PRACTITIONER

## 2021-07-02 PROCEDURE — 76830 TRANSVAGINAL US NON-OB: CPT | Performed by: RADIOLOGY

## 2021-07-02 PROCEDURE — 83615 LACTATE (LD) (LDH) ENZYME: CPT | Performed by: INTERNAL MEDICINE

## 2021-07-02 PROCEDURE — 84100 ASSAY OF PHOSPHORUS: CPT | Performed by: PHYSICIAN ASSISTANT

## 2021-07-02 PROCEDURE — 85060 BLOOD SMEAR INTERPRETATION: CPT | Performed by: INTERNAL MEDICINE

## 2021-07-02 PROCEDURE — 99232 SBSQ HOSP IP/OBS MODERATE 35: CPT | Performed by: INTERNAL MEDICINE

## 2021-07-02 PROCEDURE — 82607 VITAMIN B-12: CPT | Performed by: NURSE PRACTITIONER

## 2021-07-02 PROCEDURE — 25010000002 FILGRASTIM PER 300 MCG: Performed by: NURSE PRACTITIONER

## 2021-07-02 PROCEDURE — 85025 COMPLETE CBC W/AUTO DIFF WBC: CPT | Performed by: PHYSICIAN ASSISTANT

## 2021-07-02 PROCEDURE — 94799 UNLISTED PULMONARY SVC/PX: CPT

## 2021-07-02 PROCEDURE — G0432 EIA HIV-1/HIV-2 SCREEN: HCPCS | Performed by: NURSE PRACTITIONER

## 2021-07-02 PROCEDURE — 80053 COMPREHEN METABOLIC PANEL: CPT | Performed by: PHYSICIAN ASSISTANT

## 2021-07-02 PROCEDURE — 76830 TRANSVAGINAL US NON-OB: CPT

## 2021-07-02 PROCEDURE — 85007 BL SMEAR W/DIFF WBC COUNT: CPT | Performed by: PHYSICIAN ASSISTANT

## 2021-07-02 PROCEDURE — 85610 PROTHROMBIN TIME: CPT | Performed by: INTERNAL MEDICINE

## 2021-07-02 PROCEDURE — 83735 ASSAY OF MAGNESIUM: CPT | Performed by: PHYSICIAN ASSISTANT

## 2021-07-02 PROCEDURE — 83010 ASSAY OF HAPTOGLOBIN QUANT: CPT | Performed by: INTERNAL MEDICINE

## 2021-07-02 PROCEDURE — 82728 ASSAY OF FERRITIN: CPT | Performed by: NURSE PRACTITIONER

## 2021-07-02 PROCEDURE — 80074 ACUTE HEPATITIS PANEL: CPT | Performed by: PHYSICIAN ASSISTANT

## 2021-07-02 PROCEDURE — 83540 ASSAY OF IRON: CPT | Performed by: NURSE PRACTITIONER

## 2021-07-02 PROCEDURE — 84466 ASSAY OF TRANSFERRIN: CPT | Performed by: NURSE PRACTITIONER

## 2021-07-02 PROCEDURE — 86304 IMMUNOASSAY TUMOR CA 125: CPT | Performed by: OBSTETRICS & GYNECOLOGY

## 2021-07-02 PROCEDURE — 99222 1ST HOSP IP/OBS MODERATE 55: CPT | Performed by: NURSE PRACTITIONER

## 2021-07-02 PROCEDURE — 85045 AUTOMATED RETICULOCYTE COUNT: CPT | Performed by: INTERNAL MEDICINE

## 2021-07-02 RX ORDER — ROSUVASTATIN CALCIUM 10 MG/1
10 TABLET, COATED ORAL NIGHTLY
Status: DISCONTINUED | OUTPATIENT
Start: 2021-07-02 | End: 2021-07-03 | Stop reason: HOSPADM

## 2021-07-02 RX ORDER — BACLOFEN 10 MG/1
10 TABLET ORAL 2 TIMES DAILY PRN
Status: DISCONTINUED | OUTPATIENT
Start: 2021-07-02 | End: 2021-07-03 | Stop reason: HOSPADM

## 2021-07-02 RX ORDER — IBUPROFEN 600 MG/1
600 TABLET ORAL EVERY 8 HOURS PRN
Status: DISCONTINUED | OUTPATIENT
Start: 2021-07-02 | End: 2021-07-03 | Stop reason: HOSPADM

## 2021-07-02 RX ORDER — ALPRAZOLAM 0.5 MG/1
0.5 TABLET ORAL 2 TIMES DAILY PRN
Status: DISCONTINUED | OUTPATIENT
Start: 2021-07-02 | End: 2021-07-03 | Stop reason: HOSPADM

## 2021-07-02 RX ADMIN — DOXYCYCLINE 100 MG: 100 INJECTION, POWDER, LYOPHILIZED, FOR SOLUTION INTRAVENOUS at 06:11

## 2021-07-02 RX ADMIN — Medication 1 CAPSULE: at 08:15

## 2021-07-02 RX ADMIN — DOXYCYCLINE 100 MG: 100 INJECTION, POWDER, LYOPHILIZED, FOR SOLUTION INTRAVENOUS at 17:15

## 2021-07-02 RX ADMIN — SODIUM CHLORIDE, PRESERVATIVE FREE 10 ML: 5 INJECTION INTRAVENOUS at 08:14

## 2021-07-02 RX ADMIN — SODIUM CHLORIDE, PRESERVATIVE FREE 10 ML: 5 INJECTION INTRAVENOUS at 20:38

## 2021-07-02 RX ADMIN — PANTOPRAZOLE SODIUM 40 MG: 40 TABLET, DELAYED RELEASE ORAL at 06:11

## 2021-07-02 RX ADMIN — FILGRASTIM 300 MCG: 300 INJECTION, SOLUTION INTRAVENOUS; SUBCUTANEOUS at 15:39

## 2021-07-02 RX ADMIN — ROSUVASTATIN CALCIUM 10 MG: 10 TABLET, FILM COATED ORAL at 20:38

## 2021-07-02 RX ADMIN — ALPRAZOLAM 0.5 MG: 0.5 TABLET ORAL at 15:39

## 2021-07-02 RX ADMIN — Medication 1 CAPSULE: at 00:30

## 2021-07-02 NOTE — CONSULTS
Name:  Merle Granados  :  1960    DATE OF ADMISSION  2021    DATE OF CONSULT  2021     REFERRING PHYSICIAN  Dr. Rinaldi    PRIMARY CARE PHYSICIAN  Jakub Triana, APRN    REASON FOR CONSULT  Pancytopenia, neutropenia    CHIEF COMPLAINT:  Chief Complaint   Patient presents with   • Fever   • Generalized Body Aches       HISTORY OF PRESENT ILLNESS:   Merle Granados is a 60 y.o. female who is being seen in consultation at the request of Dr. Rinaldi for further evaluation and management of pancytopenia and neutropenia. Patient has been feeling poorly over the past ~1 week with complaints of increased fatigue, weakness, fever, body aches and headache and presented to the ED for further evaluation. She was admitted with sepsis secondary to suspected tickborne illness (reports tick bite on L ankle ~3weeks ago) and possible UTI (was receiving outpatient treatment with Omnicef). Noted leukopenia on admission WBC ~2.60 and thrombocytopenia, platelets ~76,000 which are both trending down. Her WBC ~1.97 today and now neutropenic (ANC 0.86). Platelets ~52,000 today. Her Hg was normal on admission but now also trending down, ~10.5 today. No previous CBCs available to review. However, patient says she follows with her PCP routinely with blood testing every ~6 months and is not aware of counts being low in the past.  Initial workup showed CRP was elevated and suggestive of underlying inflammation. Acute hepatitis panel was non reactive. PT/INR normal. Retic was normal. LDH was elevated and likely secondary to infection. Haptoglobin is pending. PBS has been sent and pending. BC show NGTD. Rickettsial serologies are pending. At present, patient is resting in bed. She continues with fatigue and weakness. She reports having poor appetite the past few days but this is currently improved. Patient with temp 100.9 on admission but currently afebrile. Denies any abnormal bleeding or bruising.  She denies any other  complaints at this time.     PAST MEDICAL HISTORY  Past Medical History:   Diagnosis Date   • Arthritis     dhruv knees   • Skin cancer     left arm       PAST SURGICAL HISTORY  Past Surgical History:   Procedure Laterality Date   • TUBAL ABDOMINAL LIGATION         SOCIAL HISTORY  Social History     Socioeconomic History   • Marital status:      Spouse name: Not on file   • Number of children: Not on file   • Years of education: Not on file   • Highest education level: Not on file   Tobacco Use   • Smoking status: Never Smoker   • Smokeless tobacco: Never Used   Substance and Sexual Activity   • Alcohol use: No   • Drug use: No   • Sexual activity: Defer       FAMILY HISTORY  No family history on file.    ALLERGIES  No Known Allergies    INPATIENT MEDICATIONS  Current Facility-Administered Medications   Medication Dose Route Frequency Provider Last Rate Last Admin   • acetaminophen (TYLENOL) tablet 650 mg  650 mg Oral Q6H PRN CHELSI'Shayla Cohen PA   650 mg at 07/01/21 1934   • doxycycline (VIBRAMYCIN) 100 mg/100 mL 0.9% NS MBP  100 mg Intravenous Q12H O'Shayla Cohen PA   100 mg at 07/02/21 0611   • lactobacillus acidophilus (RISAQUAD) capsule 1 capsule  1 capsule Oral Daily O'Shayla Cohen PA   1 capsule at 07/02/21 0815   • melatonin tablet 5 mg  5 mg Oral Nightly PRN CHELSI'Shayla Cohen PA       • nitroglycerin (NITROSTAT) SL tablet 0.4 mg  0.4 mg Sublingual Q5 Min PRN Shayla Mcintosh PA       • pantoprazole (PROTONIX) EC tablet 40 mg  40 mg Oral Q AM CHELSI'Shayla Cohen PA   40 mg at 07/02/21 0611   • Pharmacy to Dose enoxaparin (LOVENOX)   Does not apply Continuous PRN CHELSI'Shayla Cohen PA       • sodium chloride 0.9 % flush 10 mL  10 mL Intravenous PRN Shayla Mcintosh PA       • sodium chloride 0.9 % flush 10 mL  10 mL Intravenous Q12H CHELSI'Shayla Cohen PA   10 mL at 07/02/21 0814   • sodium chloride 0.9 % flush 10 mL  10 mL Intravenous PRN Shayla Mcintosh PA       • sodium chloride 0.9 %  "infusion  75 mL/hr Intravenous Continuous Shayla Mcintosh PA 75 mL/hr at 07/01/21 1927 75 mL/hr at 07/01/21 1927       Review of Systems  A comprehensive 14 point review of systems was performed.  Significant findings as mentioned above.  All other systems reviewed and are negative.     Physical Exam   Vital Signs: /76 (BP Location: Left arm, Patient Position: Lying)   Pulse 93   Temp 97.8 °F (36.6 °C) (Oral)   Resp 16   Ht 154.9 cm (61\")   Wt 57.9 kg (127 lb 11.2 oz)   SpO2 97%   BMI 24.13 kg/m²   General: Well developed, well nourished, alert and oriented x 3, in no acute distress.   Head: ATNC   Eyes: PERRL, No evidence of conjunctivitis.   Nose: No nasal discharge.   Mouth: Oral mucosal membranes moist. No oral ulceration or hemorrhages.   Neck: Neck supple. No thyromegaly. No JVD.   Lungs: Clear in all fields to A&P without rales, rhonchi or wheezing.   Heart: S1, S2. Regular rate and rhythm. No murmurs, rubs, or gallops.   Abdomen: Soft. Bowel sounds are normoactive. Nontender with palpation.   Extremities: No cyanosis or edema.   Integumentary: No rash, sores, erythema or nodules. No blistering, bruising, or dry skin.   Neurologic:MS as above, grossly non focal exam.     IMAGING:  XR Chest 2 View    Result Date: 7/1/2021  EXAM:   XR Chest, 2 Views  EXAM DATE:   7/1/2021 10:25 AM  CLINICAL HISTORY:   chills  TECHNIQUE:   Frontal and lateral views of the chest.  COMPARISON:   No relevant prior studies available.  FINDINGS:   LUNGS:  Unremarkable.  No consolidation.   PLEURAL SPACE:  Unremarkable.  No pneumothorax.   HEART:  Unremarkable.  No cardiomegaly.   MEDIASTINUM:  Unremarkable.   BONES/JOINTS:  Unremarkable.        No acute findings in the chest.  This report was finalized on 7/1/2021 10:38 AM by Dr. Rc Cheung MD.      US Non-ob Transvaginal    Result Date: 7/2/2021  EXAM:   US Pelvis Transabdominal and Transvaginal, Complete  EXAM DATE:   7/2/2021 11:57 AM  CLINICAL HISTORY:   " Adenexal mass; D72.819-Decreased white blood cell count, unspecified; D69.6-Thrombocytopenia, unspecified; R19.00-Intra-abdominal and pelvic swelling, mass and lump, unspecified site  TECHNIQUE:   Real-time complete transabdominal and transvaginal pelvic ultrasound with image documentation.  Transvaginal imaging was used for better evaluation of the endometrium and adnexa.  COMPARISON:   CT performed on 07/01/2021  FINDINGS:   UTERUS/CERVIX:  3.8 cm left of midline solid mass with circumferential calcifications appears to be extending from the uterus and probably represents exophytic uterine fibroid.  Small amount of fluid in the cervix.  Endometrial stripe is not visualized.   RIGHT OVARY:  Ovaries are not well seen.   LEFT OVARY:  See above.   FREE FLUID:  No free fluid.   BLADDER:  Unremarkable as visualized.  Wall is normal thickness for degree of distention.      1.  3.8 cm left of midline solid mass with circumferential calcifications appears to be extending from the uterus and probably represents exophytic uterine fibroid. 2.  Small amount of fluid in the cervix.  This report was finalized on 7/2/2021 12:13 PM by Dr. Rc Cheung MD.      CT Abdomen Pelvis Stone Protocol    Result Date: 7/1/2021  EXAM:   CT Abdomen and Pelvis Without Intravenous Contrast  EXAM DATE:   7/1/2021 10:14 AM  CLINICAL HISTORY:   Flank pain, kidney stone suspected  TECHNIQUE:   Axial computed tomography images of the abdomen and pelvis without intravenous contrast.  Sagittal and coronal reformatted images were created and reviewed.  This CT exam was performed using one or more of the following dose reduction techniques:  automated exposure control, adjustment of the mA and/or kV according to patient size, and/or use of iterative reconstruction technique.  COMPARISON:   No relevant prior studies available.  FINDINGS:   LUNG BASES:  Unremarkable.  No mass.  No consolidation.   ABDOMEN:   LIVER:  Unremarkable.   GALLBLADDER AND BILE  DUCTS:  Unremarkable.  No calcified stones.  No ductal dilation.   PANCREAS:  Unremarkable.  No ductal dilation.   SPLEEN:  Unremarkable.  No splenomegaly.   ADRENALS:  Unremarkable.  No mass.   KIDNEYS AND URETERS:  Nonobstructive bilateral renal calculi noted.   STOMACH AND BOWEL:  Unremarkable.  No obstruction.  No mucosal thickening.   PELVIS:   APPENDIX:  No findings to suggest acute appendicitis.   BLADDER:  Unremarkable.  No stones.   REPRODUCTIVE:  3.4 cm left adnexal region mass.   ABDOMEN and PELVIS:   INTRAPERITONEAL SPACE:  Unremarkable.  No free air.  No significant fluid collection.   BONES/JOINTS:  No acute fracture.  No dislocation.   SOFT TISSUES:  Unremarkable.   VASCULATURE:  Unremarkable.  No abdominal aortic aneurysm.   LYMPH NODES:  Unremarkable.  No enlarged lymph nodes.      1.  Nonobstructive bilateral renal calculi noted. 2.  3.4 cm left adnexal region mass.  This report was finalized on 7/1/2021 10:20 AM by Dr. Rc Cheung MD.        RECENT LABS:  Lab Results   Component Value Date    WBC 1.97 (C) 07/02/2021    HGB 10.5 (L) 07/02/2021    HCT 32.6 (L) 07/02/2021    MCV 86.5 07/02/2021    RDW 13.5 07/02/2021    PLT 52 (L) 07/02/2021    NEUTRORELPCT 43.6 07/02/2021    LYMPHORELPCT 38.6 07/02/2021    MONORELPCT 11.7 07/02/2021    EOSRELPCT 1.0 07/02/2021    BASORELPCT 1.0 07/02/2021    NEUTROABS 0.86 (L) 07/02/2021    LYMPHSABS 0.76 07/02/2021       Lab Results   Component Value Date     07/02/2021    K 3.7 07/02/2021    CO2 24.7 07/02/2021     07/02/2021    BUN 9 07/02/2021    CREATININE 0.62 07/02/2021    EGFRIFNONA 98 07/02/2021    GLUCOSE 95 07/02/2021    CALCIUM 8.5 (L) 07/02/2021    ALKPHOS 72 07/02/2021    AST 67 (H) 07/02/2021    ALT 51 (H) 07/02/2021    BILITOT 0.4 07/02/2021    ALBUMIN 3.10 (L) 07/02/2021    PROTEINTOT 5.6 (L) 07/02/2021    MG 1.9 07/02/2021    PHOS 3.0 07/02/2021     Lab Results   Component Value Date    RETICCTPCT 1.05 07/02/2021    RETIC 0.0405  07/02/2021     ASSESSMENT & PLAN:  Merle Granados is a very pleasant 60 y.o. female with    1.  Pancytopenia, neutropenia:  -Noted leukopenia on admission WBC ~2.60 and thrombocytopenia, platelets ~76,000 which are both trending down. Her WBC ~1.97 today and now neutropenic (ANC 0.86). Platelets ~52,000 today. Her Hg was normal on admission but now also trending down, ~10.5 today. No previous CBCs available to review. However, patient says she follows with her PCP routinely with blood testing every ~6 months and is not aware of counts being low in the past.   -Initial workup showed CRP was elevated and suggestive of underlying inflammation. Acute hepatitis panel was non reactive. PT/INR normal. Retic was normal. LDH was elevated and likely secondary to infection. Haptoglobin is pending. PBS has been sent and pending. BC show NGTD. Rickettsial serologies are pending.   -PBS personally reviewed alongside with Dr. Esquivel and RBCs with normal appearance, rare fragment. WBC reduced with neutrophilic predominance, hypogranular. Activated lymphocytes, bands and monocytes also noted. Manual platelet count ~60-70,000. However, true platelet count likely higher as she had significant platelet clumping in several fields. Overall, PBS c/w bone marrow suppression related to infection.   -Based on the above, likely secondary to acute (suspected tickborne) illness and underlying inflammation causing bone marrow suppression.   -Patient currently in neutropenic precautions. Will also start her on course of Neupogen.   -Will follow up with pending labs. Will check nutritional studies, B12, folate, iron panel and ferritin. Will also check HIV panel.   -Will continue to monitor.Suggest to transfuse PRBCs for Hg <7 or <8 if symptomatic. Suggest to transfuse platelets for platelets <50,000 prior to procedures or any active bleeding. Otherwise, transfuse for platelet count 10-20,000. Avoid NSAIDs.    2. Suspected tickborne illness:    -Ongoing management/workup per primary team and/or ID.    3. Adnexal Mass:  -CT A/P from 7/1/21 showed 3.4 cm left adnexal region mass. Recommend GYN evaluation and consult has been placed by primary team.     The patient was in agreement with the plan and all questions were answered to her satisfaction.     Thank you so much for the consultation and allowing us to participate in the care of Ms. Mckinley. Please do not hesitate to contact Hem/Onc with any questions or concerns.         Electronically Signed by: VIDA Thomas , July 2, 2021 12:44 EDT

## 2021-07-02 NOTE — CASE MANAGEMENT/SOCIAL WORK
Discharge Planning Assessment   [facility]     Patient Name: Merle Granados  MRN: [id number]  Today's Date: 7/2/2021    Admit Date: 7/1/2021    Discharge Needs Assessment     Row Name 07/02/21 0903       Living Environment    Lives With  spouse    Name(s) of Who Lives With Patient  Pt lives at home with spouse Spencer and plans to return at d/c.    Current Living Arrangements  home/apartment/condo    Primary Care Provided by  self    Family Caregiver if Needed  spouse    Family Caregiver Names  Spencer Granados [phone]    Quality of Family Relationships  helpful;supportive    Able to Return to Prior Arrangements  yes       Resource/Environmental Concerns    Resource/Environmental Concerns  none    Transportation Concerns  car, none       Transition Planning    Patient/Family Anticipates Transition to  home    Transportation Anticipated  family or friend will provide       Discharge Needs Assessment    Readmission Within the Last 30 Days  no previous admission in last 30 days    Equipment Currently Used at Home  glucometer    Concerns to be Addressed  no discharge needs identified;denies needs/concerns at this time    Anticipated Changes Related to Illness  none    Equipment Needed After Discharge  none        Discharge Plan     Row Name 07/02/21 6293       Plan    Plan CM spoke with pt via telephone. Pt lives at home with spouse Spencer and plans to return home at d/c. Pt has a glucometer. Pt does not have HH and denies any DME/HH needs. PCP is Jakub MCPHERSON and she get rx filled at [address] in [place] and is enrolled with meds to bed. Pt has [employer] and denies any issues with coverage or copays. Pt's spouse will transport at d/c.    Patient/Family in Agreement with Plan  yes    Plan Comments Sepsis, tickborne illness, poss UTI.  7/2: Admit MS, c/o N/V, difficulty urinating, poor intake, tick panel pending, Doxy IV, IVF @75, bld and urine cx pending, consult GYN for discolored vag discharge and  left adnexal mass, WBC 1.97, temp max 100.9, she is tolerating reg diet and has been up to BR.        Lorie Nair RN

## 2021-07-02 NOTE — PLAN OF CARE
Goal Outcome Evaluation:           Progress: no change  Outcome Summary: Patient has done well today. Placed in protective/neutropenic precautions. Ultrasound completed.

## 2021-07-02 NOTE — PROGRESS NOTES
[facility]     Patient Identification:  Name:  Merle Granados  Age:  60 y.o.  Sex:  female  :  1960  MRN:  [id number]  Visit Number:  [id number]  Primary Care Provider:  Jakub Triana APRN    Length of stay:  1    Chief complaint: General lethargy    Subjective:    Patient reports her general malaise and lethargy has much improved compared to yesterday.  She specifically denies any fevers, chills, sweats, rigors, nausea, vomiting or abdominal pain.  No new events overnight  ----------------------------------------------------------------------------------------------------------------------  Current Hospital Meds:  doxycycline, 100 mg, Intravenous, Q12H  filgrastim (NEUPOGEN) injection, 300 mcg, Subcutaneous, Daily  lactobacillus acidophilus, 1 capsule, Oral, Daily  pantoprazole, 40 mg, Oral, Q AM  sodium chloride, 10 mL, Intravenous, Q12H      Pharmacy to Dose enoxaparin (LOVENOX),   sodium chloride, 75 mL/hr, Last Rate: 75 mL/hr (21)      ----------------------------------------------------------------------------------------------------------------------  Vital Signs:  Temp:  [97.8 °F (36.6 °C)-100.9 °F (38.3 °C)] 98.6 °F (37 °C)  Heart Rate:  [] 93  Resp:  [16-20] 18  BP: (110-155)/(68-82) 131/76      21  0913 21   Weight: 59 kg (130 lb) 57.9 kg (127 lb 11.2 oz)     Body mass index is 24.13 kg/m².    Intake/Output Summary (Last 24 hours) at 2021 1510  Last data filed at 2021 1304  Gross per 24 hour   Intake 1260 ml   Output --   Net 1260 ml     Diet Regular  ----------------------------------------------------------------------------------------------------------------------  Physical exam:  Constitutional: Well-nourished  female in no apparent distress.     HENT:  Head:  Normocephalic and atraumatic.  Mouth:  Moist mucous membranes.    Eyes:  Conjunctivae and EOM are normal.  Pupils are equal, round, and  reactive to light.  No scleral icterus.    Neck:  Neck supple. No thyromegaly.  No JVD present.    Cardiovascular:  Regular rate and rhythm with no murmurs, rubs, clicks or gallops appreciated.  Pulmonary/Chest:  Clear to auscultation bilaterally with no crackles, wheezes or rhonchi appreciated.  Abdominal:  Soft. Nontender. Nondistended  Bowel sounds are normal in all four quadrants. No organomegally appreciated.   Musculoskeletal:   No edema, no tenderness, and no deformity.  No red or swollen joints anywhere.    Neurological:  Alert and oriented to person, place, and time. Cranial nerves II-XII intact with no focal deficits.  No facial droop.  No slurred speech.   Skin:  Warm and dry to palpation with no rashes or lesions appreciated.  Peripheral vascular:  2+ radial and pedal pulses in bilateral upper and lower extremities.  Psychiatric:  Alert and oriented x3, demonstrates appropriate judgement and insight.  ----------------------------------------------------------------------------------------------------------------------  Tele:    ----------------------------------------------------------------------------------------------------------------------      Results from last 7 days   Lab Units 07/02/21  1034 07/02/21  0451 07/01/21  1935 07/01/21  1007 07/01/21  0938   CRP mg/dL  --   --  4.22*  --   --    LACTATE mmol/L  --   --   --  1.5  --    WBC 10*3/mm3  --  1.97*  --   --  2.60*   HEMOGLOBIN g/dL  --  10.5*  --   --  13.2   HEMATOCRIT %  --  32.6*  --   --  40.3   MCV fL  --  86.5  --   --  85.9   MCHC g/dL  --  32.2  --   --  32.8   PLATELETS 10*3/mm3  --  52*  --   --  76*   INR  1.02  --   --   --   --          Results from last 7 days   Lab Units 07/02/21  0451 07/01/21  1935 07/01/21  0938   SODIUM mmol/L 138  --  131*   POTASSIUM mmol/L 3.7  --  3.1*   MAGNESIUM mg/dL 1.9 1.9  --    CHLORIDE mmol/L 107  --  95*   CO2 mmol/L 24.7  --  25.4   BUN mg/dL 9  --  12   CREATININE mg/dL 0.62  --  0.86    EGFR IF NONAFRICN AM mL/min/1.73 98  --  67   CALCIUM mg/dL 8.5*  --  9.2   GLUCOSE mg/dL 95  --  113*   ALBUMIN g/dL 3.10*  --  4.01   BILIRUBIN mg/dL 0.4  --  0.8   ALK PHOS U/L 72  --  84   AST (SGOT) U/L 67*  --  80*   ALT (SGPT) U/L 51*  --  58*   Estimated Creatinine Clearance: 77.9 mL/min (by C-G formula based on SCr of 0.62 mg/dL).    No results found for: AMMONIA      Blood Culture   Date Value Ref Range Status   07/01/2021 No growth at 24 hours  Preliminary   07/01/2021 No growth at 24 hours  Preliminary     Urine Culture   Date Value Ref Range Status   07/01/2021 No growth  Final     No results found for: WOUNDCX  No results found for: STOOLCX    I have personally looked at the labs and they are summarized above.  ----------------------------------------------------------------------------------------------------------------------  Imaging Results (Last 24 Hours)     Procedure Component Value Units Date/Time    US Non-ob Transvaginal [[id number]] Collected: 07/02/21 1213     Updated: 07/02/21 1224    Narrative:      EXAM:    US Pelvis Transabdominal and Transvaginal, Complete     EXAM DATE:    7/2/2021 11:57 AM     CLINICAL HISTORY:    Adenexal mass; D72.819-Decreased white blood cell count, unspecified;  D69.6-Thrombocytopenia, unspecified; R19.00-Intra-abdominal and pelvic  swelling, mass and lump, unspecified site     TECHNIQUE:    Real-time complete transabdominal and transvaginal pelvic ultrasound  with image documentation.  Transvaginal imaging was used for better  evaluation of the endometrium and adnexa.     COMPARISON:    CT performed on 07/01/2021     FINDINGS:    UTERUS/CERVIX:  3.8 cm left of midline solid mass with circumferential  calcifications appears to be extending from the uterus and probably  represents exophytic uterine fibroid.  Small amount of fluid in the  cervix.  Endometrial stripe is not visualized.    RIGHT OVARY:  Ovaries are not well seen.    LEFT OVARY:  See above.    FREE  FLUID:  No free fluid.    BLADDER:  Unremarkable as visualized.  Wall is normal thickness for  degree of distention.       Impression:      1.  3.8 cm left of midline solid mass with circumferential  calcifications appears to be extending from the uterus and probably  represents exophytic uterine fibroid.  2.  Small amount of fluid in the cervix.     This report was finalized on 7/2/2021 12:13 PM by Dr. Rc Cheung MD.           ----------------------------------------------------------------------------------------------------------------------  Assessment and Plan:    1.  Sepsis -present on admission, secondary to presumed tickborne illness.  We will continue doxycycline for now.    2.  Suspected tickborne illness -continue doxycycline, rickettsial panel currently pending    3.  Acute cystitis ruled out -urine culture negative to date    4.  Neutropenia/thrombocytopenia-absolute neutrophil count is 0.83, have placed patient in reverse isolation.  Review of peripheral blood smear demonstrates findings consistent with some platelet clumping and bone marrow suppression from suspected underlying infectious illness.  Appreciate hematology/oncology recommendations.  Have started Neupogen as well.    5.  Left adnexal mass -appreciate OB/GYN recommendations.  Patient has apparently had a work-up at [facility] care for known uterine fibroid.  Transvaginal ultrasound reveals a pedunculated calcified fibroid of no clinical consequence which requires no further work-up.    6.  Hypokalemia -resolved    Disposition we will continue reverse isolation until absolute neutrophil count is above 1500.    Maxwell Rinaldi, DO   07/02/21   15:10 EDT         Maxwell Rinaldi, DO  07/02/21  15:10 EDT

## 2021-07-02 NOTE — CONSULTS
Clinical information and notes reviewed.    I was consulted secondary to a 3.2 cm left adnexal mass found on CT scan.  The patient has a known uterine fibroid that she is had work-up for at [place] [facility].  Transvaginal ultrasound reveals that this appears to be a pedunculated calcified fibroid.  This appears to be of no clinical consequence and requires no further work-up.  Thank you for this consultation.

## 2021-07-02 NOTE — PLAN OF CARE
Goal Outcome Evaluation:  Plan of Care Reviewed With: patient        Progress: no change  Outcome Summary: pt admitted to floor tonight. c/o headache, temp 100.9, prn tylenol given. VSS

## 2021-07-03 VITALS
HEIGHT: 61 IN | RESPIRATION RATE: 18 BRPM | TEMPERATURE: 98.1 F | OXYGEN SATURATION: 95 % | WEIGHT: 127.7 LBS | HEART RATE: 95 BPM | SYSTOLIC BLOOD PRESSURE: 147 MMHG | BODY MASS INDEX: 24.11 KG/M2 | DIASTOLIC BLOOD PRESSURE: 85 MMHG

## 2021-07-03 LAB
ANION GAP SERPL CALCULATED.3IONS-SCNC: 12.9 MMOL/L (ref 5–15)
BUN SERPL-MCNC: 8 MG/DL (ref 8–23)
BUN/CREAT SERPL: 12.3 (ref 7–25)
CALCIUM SPEC-SCNC: 8.7 MG/DL (ref 8.6–10.5)
CHLORIDE SERPL-SCNC: 106 MMOL/L (ref 98–107)
CO2 SERPL-SCNC: 22.1 MMOL/L (ref 22–29)
CREAT SERPL-MCNC: 0.65 MG/DL (ref 0.57–1)
DEPRECATED RDW RBC AUTO: 43.8 FL (ref 37–54)
EOSINOPHIL # BLD MANUAL: 0.26 10*3/MM3 (ref 0–0.4)
EOSINOPHIL NFR BLD MANUAL: 2 % (ref 0.3–6.2)
ERYTHROCYTE [DISTWIDTH] IN BLOOD BY AUTOMATED COUNT: 13.8 % (ref 12.3–15.4)
FOLATE SERPL-MCNC: >20 NG/ML (ref 4.78–24.2)
GFR SERPL CREATININE-BSD FRML MDRD: 93 ML/MIN/1.73
GLUCOSE SERPL-MCNC: 87 MG/DL (ref 65–99)
HCT VFR BLD AUTO: 32.8 % (ref 34–46.6)
HGB BLD-MCNC: 10.6 G/DL (ref 12–15.9)
LYMPHOCYTES # BLD MANUAL: 2.5 10*3/MM3 (ref 0.7–3.1)
LYMPHOCYTES NFR BLD MANUAL: 19 % (ref 19.6–45.3)
LYMPHOCYTES NFR BLD MANUAL: 3 % (ref 5–12)
MCH RBC QN AUTO: 28 PG (ref 26.6–33)
MCHC RBC AUTO-ENTMCNC: 32.3 G/DL (ref 31.5–35.7)
MCV RBC AUTO: 86.5 FL (ref 79–97)
METAMYELOCYTES NFR BLD MANUAL: 1 % (ref 0–0)
MONOCYTES # BLD AUTO: 0.4 10*3/MM3 (ref 0.1–0.9)
NEUTROPHILS # BLD AUTO: 9.88 10*3/MM3 (ref 1.7–7)
NEUTROPHILS NFR BLD MANUAL: 55 % (ref 42.7–76)
NEUTS BAND NFR BLD MANUAL: 20 % (ref 0–5)
PLATELET # BLD AUTO: 47 10*3/MM3 (ref 140–450)
PMV BLD AUTO: ABNORMAL FL
POTASSIUM SERPL-SCNC: 3.8 MMOL/L (ref 3.5–5.2)
RBC # BLD AUTO: 3.79 10*6/MM3 (ref 3.77–5.28)
RBC MORPH BLD: NORMAL
SCAN SLIDE: NORMAL
SMALL PLATELETS BLD QL SMEAR: ABNORMAL
SODIUM SERPL-SCNC: 141 MMOL/L (ref 136–145)
VIT B12 BLD-MCNC: 1479 PG/ML (ref 211–946)
WBC # BLD AUTO: 13.17 10*3/MM3 (ref 3.4–10.8)

## 2021-07-03 PROCEDURE — 80048 BASIC METABOLIC PNL TOTAL CA: CPT | Performed by: INTERNAL MEDICINE

## 2021-07-03 PROCEDURE — 99239 HOSP IP/OBS DSCHRG MGMT >30: CPT | Performed by: INTERNAL MEDICINE

## 2021-07-03 PROCEDURE — 85025 COMPLETE CBC W/AUTO DIFF WBC: CPT | Performed by: INTERNAL MEDICINE

## 2021-07-03 PROCEDURE — 85007 BL SMEAR W/DIFF WBC COUNT: CPT | Performed by: INTERNAL MEDICINE

## 2021-07-03 RX ORDER — DOXYCYCLINE HYCLATE 100 MG
100 TABLET ORAL 2 TIMES DAILY
Qty: 16 TABLET | Refills: 0 | Status: SHIPPED | OUTPATIENT
Start: 2021-07-03 | End: 2021-07-11

## 2021-07-03 RX ADMIN — SODIUM CHLORIDE, PRESERVATIVE FREE 10 ML: 5 INJECTION INTRAVENOUS at 09:19

## 2021-07-03 RX ADMIN — SODIUM CHLORIDE 75 ML/HR: 9 INJECTION, SOLUTION INTRAVENOUS at 01:28

## 2021-07-03 RX ADMIN — DOXYCYCLINE 100 MG: 100 INJECTION, POWDER, LYOPHILIZED, FOR SOLUTION INTRAVENOUS at 05:45

## 2021-07-03 RX ADMIN — ACETAMINOPHEN 650 MG: 325 TABLET ORAL at 09:30

## 2021-07-03 RX ADMIN — PANTOPRAZOLE SODIUM 40 MG: 40 TABLET, DELAYED RELEASE ORAL at 05:45

## 2021-07-03 RX ADMIN — Medication 1 CAPSULE: at 09:19

## 2021-07-03 RX ADMIN — ACETAMINOPHEN 650 MG: 325 TABLET ORAL at 01:30

## 2021-07-03 NOTE — DISCHARGE SUMMARY
[facility] HOSPITALIST MEDICINE DISCHARGE SUMMARY    Patient Identification:  Name:  Merle Granados  Age:  60 y.o.  Sex:  female  :  1960  MRN:  [id number]  Visit Number:  [id number]    Date of Admission: 2021  Date of Discharge: 7/3/2021    PCP: Jakub Triana, APRN    DISCHARGE DIAGNOSIS   1.  Sepsis (present on admission, resolved)  2.  Suspected tickborne illness  3.  Acute cystitis ruled out  4.  Neutropenia/thrombocytopenia  5.  Hypokalemia  6.  Pedunculated calcified fibroid      CONSULTS  1. Dr. Harkins, OB/GYN  2. Clau Tafoya, JOHN, Heme/Onc      PROCEDURES PERFORMED   None      HOSPITAL COURSE  Ms. Granados is a 60 y.o. female who presented to [facility] ED on 2021 with a chief complaint of fever with generalized body aches.  Patient has a past medical history remarkable for GERD and anxiety.  Patient reported onset of symptoms approximately 4 days prior to evaluation in the emergency department.  She states she felt nauseated with intermittent emesis which progressed to difficulty urinating and general malaise and fatigue.  She also endorsed subjective fevers and chills.  She also endorsed arthralgias and myalgias.  For this reason, she presented to the emergency department for further treatment and evaluation.  Initial evaluation in the emergency department did consist of basic laboratory work as well as physical exam and vital signs.  Initial vital signs found patient's blood pressure 147/76, respirations 18, heart rate 117 and temperature 99.1 °F.  Initial lab work did include CBC and CMP.  CMP demonstrated mildly elevated LFTs with an ALT of 58 and AST of 80.  CBC demonstrated leukopenia with white blood cell count of 2.6 and thrombocytopenia with platelets 76.  A CT of abdomen and pelvis was also obtained which demonstrated a left adnexal mass.  Secondary to thrombocytopenia and neutropenia, patient was admitted for further treatment and evaluation.    In  regards to neutropenia, thrombocytopenia and mildly elevated LFTs with arthralgias and myalgias, deep suspicion was held for possible tickborne illness.  After further questioning, patient did report having a tick bite on her left lower extremity approximately 2 weeks prior to evaluation in the emergency department.  As such, a rickettsial panel was obtained and is currently pending at time of dictation.  Did start patient on empiric antibiotic therapy with doxycycline 100 mg IV twice daily.  Patient did report dramatic improvement in symptoms within 24 hours of treatment.  On second day of hospitalization, patient's neutropenia did worsen and absolute neutrophil count dropped below 1500.  Patient was placed in reverse isolation.  Hematology/oncology services were consulted who did thoroughly evaluate the patient.  After thorough evaluation, it was felt patient's presentation was most consistent with bone marrow suppression from overwhelming sepsis and no underlying malignancy was found.  In regards to left adnexal mass, transvaginal ultrasound was obtained as well as OB/GYN consultation.  After thorough evaluation, left adnexal mass was felt to be a pedunculated calcified uterine fibroid.  Patient was started on Neupogen per hematology/oncology recommendations.  Patient's white blood cell count dramatically improved in the next 24 hours to 13,000 from 1970.  Neutropenia did resolve.  As patient is no longer neutropenic and patient's symptoms are much improved, it is felt within reason to discharge patient home in stable condition today.  Have recommended patient follow-up with her primary care provider within 1 week to review results of rickettsial panel.  Patient still with thrombocytopenia but without any bleeding.  No need for transfusion at this time.  Also recommended follow-up with primary care provider within 1 week to ensure resolution of thrombocytopenia.  Patient will be given a prescription for  doxycycline 100 mg p.o. twice daily to complete a 10-day course of antibiotic therapy.  With this in mind, it is felt patient has reached maximum medical benefit of current hospitalization and will be discharged home in stable condition today.  The beforementioned plan was thoroughly discussed with the patient and she expressed understanding and willingness to proceed with the beforementioned plan.    VITAL SIGNS:      07/01/21  0913 07/01/21  1918   Weight: 59 kg (130 lb) 57.9 kg (127 lb 11.2 oz)     Body mass index is 24.13 kg/m².    PHYSICAL EXAM:  Constitutional: Well-nourished  female in no apparent distress.     HENT:  Head:  Normocephalic and atraumatic.  Mouth:  Moist mucous membranes.    Eyes:  Conjunctivae and EOM are normal.  Pupils are equal, round, and reactive to light.  No scleral icterus.    Neck:  Neck supple. No thyromegaly.  No JVD present.    Cardiovascular:  Regular rate and rhythm with no murmurs, rubs, clicks or gallops appreciated.  Pulmonary/Chest:  Clear to auscultation bilaterally with no crackles, wheezes or rhonchi appreciated.  Abdominal:  Soft. Nontender. Nondistended  Bowel sounds are normal in all four quadrants. No organomegally appreciated.   Musculoskeletal:   No edema, no tenderness, and no deformity.  No red or swollen joints anywhere.    Neurological:  Alert and oriented to person, place, and time. Cranial nerves II-XII intact with no focal deficits.  No facial droop.  No slurred speech.   Skin:  Warm and dry to palpation with no rashes or lesions appreciated.  Peripheral vascular:  2+ radial and pedal pulses in bilateral upper and lower extremities.  Psychiatric:  Alert and oriented x3, demonstrates appropriate judgement and insight.    DISCHARGE DISPOSITION   Stable    DISCHARGE MEDICATIONS:     Discharge Medications      New Medications      Instructions Start Date   doxycycline 100 MG tablet  Commonly known as: VIBRAMYICN   100 mg, Oral, 2 Times Daily          Continue These Medications      Instructions Start Date   ALPRAZolam 0.5 MG tablet  Commonly known as: XANAX   0.5 mg, Oral, 2 Times Daily PRN      baclofen 10 MG tablet  Commonly known as: LIORESAL   10 mg, Oral, 2 Times Daily PRN      ibuprofen 600 MG tablet  Commonly known as: ADVIL,MOTRIN   600 mg, Oral, Every 8 Hours PRN      pantoprazole 40 MG EC tablet  Commonly known as: PROTONIX   40 mg, Oral, Daily      rosuvastatin 10 MG tablet  Commonly known as: CRESTOR   10 mg, Oral, Nightly         Stop These Medications    cefdinir 300 MG capsule  Commonly known as: OMNICEF                  Additional Instructions for the Follow-ups that You Need to Schedule     Discharge Follow-up with PCP   As directed       Currently Documented PCP:    Jakub Triana APRN    PCP Phone Number:    [phone]     Follow Up Details: please follow up with pcp in 1 week           Follow-up Information     Jakub Triana APRN .    Specialty: Family Medicine  Why: please follow up with pcp in 1 week  Contact information:  [address] [place] [address]  [phone]                   TEST  RESULTS PENDING AT DISCHARGE  Pending Labs     Order Current Status    B. Burgdorferi Antibodies, WB Reflex In process    Babesia Microti Antibody Panel In process    Ehrlichia Antibody Panel In process    Peripheral Blood Smear In process    Peripheral Blood Smear In process    Adi [place] Spotted Fever, IgM In process    Adi Mt Spotted Fever, IgG In process    Blood Culture - Blood, Arm, Left Preliminary result    Blood Culture - Blood, Arm, Right Preliminary result           Maxwell Rinaldi DO  07/03/21  14:46 EDT    Please note that this discharge summary required more than 30 minutes to complete.    Please send a copy of this dictation to the following providers:  Jakub Triana, VIDA

## 2021-07-03 NOTE — PLAN OF CARE
Goal Outcome Evaluation:  Plan of Care Reviewed With: patient        Progress: improving  Outcome Summary: Resting comfortably in bed.  Tylenol given for HA.  No other complaints voiced.

## 2021-07-03 NOTE — PLAN OF CARE
Goal Outcome Evaluation:           Progress: improving  Outcome Summary: Patient has rested in bed throughout shift. Complained of headache, PRN med given. No acute changes noted.

## 2021-07-04 NOTE — PAYOR COMM NOTE
"[facility]  TORO LIMON  PHONE  [phone]  FAX  [phone]  NPI:  [id number]    PATIENT D/C 7/3/2021      Merle Granados (60 y.o. Female)     Date of Birth Social Security Number Address Home Phone MRN    1960  [address] [place] [id number] [phone] [id number]    [employer] Marital Status          Other        Admission Date Admission Type Admitting Provider Attending Provider Department, Room/Bed    7/1/21 Emergency Maxwell Rinaldi,   [address], 3332/1P    Discharge Date Discharge Disposition Discharge Destination        7/3/2021 Home or Self Care              Attending Provider: (none)   Allergies: No Known Allergies    Isolation: None   Infection: None   Code Status: Prior    Ht: 154.9 cm (61\")   Wt: 57.9 kg (127 lb 11.2 oz)    Admission Cmt: None   Principal Problem: Tick bite [W57.XXXA]                 Active Insurance as of 7/1/2021     Primary Coverage     Payor Plan Insurance Group Employer/Plan Group    [place] [id number]     Payor Plan Address Payor Plan Phone Number Payor Plan Fax Number Effective Dates    [employer] [address] [address] [phone]  1/1/2021 - None Entered    [facility] [id number]       Subscriber Name Subscriber Birth Date Member ID       MERLE GRANADOS 1960 [id number]                 Emergency Contacts      (Rel.) Home Phone Work Phone Mobile Phone    GranadosSpencer peters (Spouse) [phone] -- [phone]              "

## 2021-07-05 LAB
R RICKETTSI IGG SER QL IA: NEGATIVE
R RICKETTSI IGM SER-ACNC: 0.71 INDEX (ref 0–0.89)

## 2021-07-06 ENCOUNTER — TELEPHONE (OUTPATIENT)
Dept: MEDSURG UNIT | Facility: HOSPITAL | Age: 61
End: 2021-07-06

## 2021-07-06 LAB
B BURGDOR IGG+IGM SER-ACNC: <0.91 ISR (ref 0–0.9)
B BURGDOR IGM SER IA-ACNC: <0.8 INDEX (ref 0–0.79)
BACTERIA SPEC AEROBE CULT: NORMAL
BACTERIA SPEC AEROBE CULT: NORMAL
CYTOLOGIST CVX/VAG CYTO: NORMAL
CYTOLOGIST CVX/VAG CYTO: NORMAL
PATH INTERP BLD-IMP: NORMAL
PATH INTERP BLD-IMP: NORMAL

## 2021-07-09 LAB
A PHAGOCYTOPH IGG TITR SER IF: NEGATIVE {TITER}
A PHAGOCYTOPH IGM TITR SER IF: NEGATIVE {TITER}
B MICROTI IGG TITR SER: NORMAL {TITER}
B MICROTI IGM TITR SER: NORMAL {TITER}
E CHAFFEENSIS IGG TITR SER IF: NEGATIVE {TITER}
E CHAFFEENSIS IGM TITR SER IF: NEGATIVE {TITER}

## 2022-05-27 ENCOUNTER — HOSPITAL ENCOUNTER (EMERGENCY)
Facility: HOSPITAL | Age: 62
Discharge: LEFT BEFORE DISCHARGE | End: 2022-05-27
Admitting: STUDENT IN AN ORGANIZED HEALTH CARE EDUCATION/TRAINING PROGRAM

## 2022-05-27 VITALS
TEMPERATURE: 98 F | WEIGHT: 126 LBS | BODY MASS INDEX: 23.79 KG/M2 | SYSTOLIC BLOOD PRESSURE: 147 MMHG | HEIGHT: 61 IN | RESPIRATION RATE: 18 BRPM | HEART RATE: 105 BPM | DIASTOLIC BLOOD PRESSURE: 84 MMHG | OXYGEN SATURATION: 99 %

## 2022-05-27 PROCEDURE — 99281 EMR DPT VST MAYX REQ PHY/QHP: CPT

## 2022-07-05 ENCOUNTER — OFFICE VISIT (OUTPATIENT)
Dept: GASTROENTEROLOGY | Facility: CLINIC | Age: 62
End: 2022-07-05

## 2022-07-05 VITALS
DIASTOLIC BLOOD PRESSURE: 89 MMHG | HEART RATE: 83 BPM | WEIGHT: 128.2 LBS | TEMPERATURE: 97.3 F | SYSTOLIC BLOOD PRESSURE: 167 MMHG | BODY MASS INDEX: 24.2 KG/M2 | HEIGHT: 61 IN

## 2022-07-05 DIAGNOSIS — K57.92 DIVERTICULITIS: Primary | ICD-10-CM

## 2022-07-05 PROCEDURE — 99213 OFFICE O/P EST LOW 20 MIN: CPT | Performed by: NURSE PRACTITIONER

## 2022-07-05 NOTE — PROGRESS NOTES
GASTROENTEROLOGY OFFICE NOTE  Merle Granados  [id number]  1960    CARE TEAM  Patient Care Team:  Jakub Triana APRN as PCP - General (Family Medicine)    Referring Provider: Jakub Triana APRN    Chief Complaint   Patient presents with   • Abdominal Pain     Improved        HISTORY OF PRESENT ILLNESS:  Ms. Granados is a 61-year-old female who presents today after recent diagnosis of diverticulitis.  She reports she was hospitalized at [facility] on 5/22 and treated for diverticulitis.  She was treated with antibiotics and has been doing well since completion of the antibiotic course.  She did not has abdominal pain, nausea, vomiting, early satiety, constipation, diarrhea.  Last colonoscopy was about 4 years ago by Dr. Rosado in [place].    PAST MEDICAL HISTORY  Past Medical History:   Diagnosis Date   • Arthritis     dhruv knees   • Hyperlipidemia    • Skin cancer     left arm        PAST SURGICAL HISTORY  Past Surgical History:   Procedure Laterality Date   • COLONOSCOPY     • TUBAL ABDOMINAL LIGATION          MEDICATIONS:    Current Outpatient Medications:   •  ALPRAZolam (XANAX) 0.5 MG tablet, Take 0.5 mg by mouth 2 (Two) Times a Day As Needed for Anxiety., Disp: , Rfl:   •  baclofen (LIORESAL) 10 MG tablet, Take 10 mg by mouth 2 (Two) Times a Day As Needed for Muscle Spasms., Disp: , Rfl:   •  ibuprofen (ADVIL,MOTRIN) 600 MG tablet, Take 600 mg by mouth Every 8 (Eight) Hours As Needed for Mild Pain  or Headache., Disp: , Rfl:   •  pantoprazole (PROTONIX) 40 MG EC tablet, Take 40 mg by mouth Daily., Disp: , Rfl:   •  rosuvastatin (CRESTOR) 10 MG tablet, Take 10 mg by mouth Every Night., Disp: , Rfl:     ALLERGIES  No Known Allergies    FAMILY HISTORY:  Family History   Problem Relation Age of Onset   • Colon cancer Neg Hx        SOCIAL HISTORY  Social History     Socioeconomic History   • Marital status:    Tobacco Use   • Smoking status: Never Smoker   • Smokeless tobacco: Never Used  "  Vaping Use   • Vaping Use: Never used   Substance and Sexual Activity   • Alcohol use: No   • Drug use: No   • Sexual activity: Defer       REVIEW OF SYSTEMS  Review of Systems   Constitutional: Negative for activity change, appetite change, chills, diaphoresis, fatigue, fever, unexpected weight gain and unexpected weight loss.   HENT: Negative for trouble swallowing and voice change.    Respiratory: Negative for cough, choking, chest tightness and shortness of breath.    Cardiovascular: Negative for chest pain, palpitations and leg swelling.   Gastrointestinal: Negative for abdominal distention, abdominal pain, anal bleeding, blood in stool, constipation, diarrhea, nausea, rectal pain, vomiting, GERD and indigestion.         PHYSICAL EXAM   /89 (BP Location: Left arm, Patient Position: Sitting, Cuff Size: Adult)   Pulse 83   Temp 97.3 °F (36.3 °C) (Temporal)   Ht 154.9 cm (61\")   Wt 58.2 kg (128 lb 3.2 oz)   BMI 24.22 kg/m²   Physical Exam  Vitals and nursing note reviewed.   Constitutional:       Appearance: Normal appearance. She is well-developed.   HENT:      Head: Normocephalic and atraumatic.      Nose: Nose normal.      Mouth/Throat:      Mouth: Mucous membranes are moist.      Pharynx: Oropharynx is clear.   Eyes:      Pupils: Pupils are equal, round, and reactive to light.   Cardiovascular:      Rate and Rhythm: Normal rate and regular rhythm.   Pulmonary:      Effort: Pulmonary effort is normal.      Breath sounds: Normal breath sounds. No wheezing or rales.   Abdominal:      General: Bowel sounds are normal.      Palpations: Abdomen is soft. There is no mass.      Tenderness: There is no abdominal tenderness. There is no guarding or rebound.      Hernia: No hernia is present.   Musculoskeletal:         General: Normal range of motion.      Cervical back: Normal range of motion and neck supple.   Skin:     General: Skin is warm and dry.   Neurological:      Mental Status: She is alert and " oriented to person, place, and time.      Cranial Nerves: No cranial nerve deficit.   Psychiatric:         Behavior: Behavior normal.         Judgment: Judgment normal.         ASSESSMENT / PLAN  1.  Diverticulitis  Clinically resolved  - Colonoscopy    Return for colonoscopy.    I discussed the patients findings and my recommendations with patient    VIDA Randolph

## 2022-07-15 ENCOUNTER — TELEPHONE (OUTPATIENT)
Dept: GASTROENTEROLOGY | Facility: CLINIC | Age: 62
End: 2022-07-15

## 2022-07-20 ENCOUNTER — TELEPHONE (OUTPATIENT)
Dept: GASTROENTEROLOGY | Facility: CLINIC | Age: 62
End: 2022-07-20

## 2022-07-20 NOTE — TELEPHONE ENCOUNTER
----- Message from Samantha Negron sent at 7/15/2022  3:04 PM EDT -----  Regarding: [facility] - TO SCHEDULE IN [place]  Gabriel Fleming APRN saw this patient in office and would like for her to have a colonoscopy due to Diverticulitis. Patient prefers to have procedure done in [place]. Can you schedule this patient for a [place] colonoscopy? Thank you for your time!

## 2022-07-21 DIAGNOSIS — K57.92 DIVERTICULITIS: Primary | ICD-10-CM

## 2022-08-09 RX ORDER — CIPROFLOXACIN 500 MG/1
500 TABLET, FILM COATED ORAL 2 TIMES DAILY
Qty: 20 TABLET | Refills: 0 | Status: SHIPPED | OUTPATIENT
Start: 2022-08-09 | End: 2022-08-19

## 2022-08-09 RX ORDER — METRONIDAZOLE 500 MG/1
500 TABLET ORAL 3 TIMES DAILY
Qty: 30 TABLET | Refills: 0 | Status: SHIPPED | OUTPATIENT
Start: 2022-08-09 | End: 2022-08-19

## 2022-10-05 DIAGNOSIS — K57.92 DIVERTICULITIS: Primary | ICD-10-CM

## 2022-10-05 RX ORDER — POLYETHYLENE GLYCOL 3350 17 G/17G
510 POWDER, FOR SOLUTION ORAL ONCE
Qty: 510 G | Refills: 0 | Status: SHIPPED | OUTPATIENT
Start: 2022-10-05 | End: 2022-10-05

## 2022-10-05 RX ORDER — BISACODYL 5 MG/1
20 TABLET, DELAYED RELEASE ORAL ONCE
Qty: 4 TABLET | Refills: 0 | Status: SHIPPED | OUTPATIENT
Start: 2022-10-05 | End: 2022-10-05

## 2022-10-17 ENCOUNTER — HOSPITAL ENCOUNTER (OUTPATIENT)
Facility: HOSPITAL | Age: 62
Setting detail: HOSPITAL OUTPATIENT SURGERY
Discharge: HOME | End: 2022-10-17
Attending: INTERNAL MEDICINE | Admitting: INTERNAL MEDICINE

## 2022-10-17 ENCOUNTER — ANESTHESIA EVENT (OUTPATIENT)
Dept: PERIOP | Facility: HOSPITAL | Age: 62
End: 2022-10-17

## 2022-10-17 ENCOUNTER — ANESTHESIA (OUTPATIENT)
Dept: PERIOP | Facility: HOSPITAL | Age: 62
End: 2022-10-17

## 2022-10-17 VITALS
WEIGHT: 127 LBS | TEMPERATURE: 96.6 F | DIASTOLIC BLOOD PRESSURE: 87 MMHG | RESPIRATION RATE: 18 BRPM | SYSTOLIC BLOOD PRESSURE: 136 MMHG | BODY MASS INDEX: 23.98 KG/M2 | HEIGHT: 61 IN | HEART RATE: 70 BPM | OXYGEN SATURATION: 94 %

## 2022-10-17 PROCEDURE — 25010000002 PROPOFOL 10 MG/ML EMULSION: Performed by: NURSE ANESTHETIST, CERTIFIED REGISTERED

## 2022-10-17 PROCEDURE — 45378 DIAGNOSTIC COLONOSCOPY: CPT | Performed by: INTERNAL MEDICINE

## 2022-10-17 PROCEDURE — S0260 H&P FOR SURGERY: HCPCS | Performed by: PHYSICIAN ASSISTANT

## 2022-10-17 RX ORDER — MEPERIDINE HYDROCHLORIDE 25 MG/ML
12.5 INJECTION INTRAMUSCULAR; INTRAVENOUS; SUBCUTANEOUS
Status: DISCONTINUED | OUTPATIENT
Start: 2022-10-17 | End: 2022-10-17 | Stop reason: HOSPADM

## 2022-10-17 RX ORDER — KETOROLAC TROMETHAMINE 30 MG/ML
30 INJECTION, SOLUTION INTRAMUSCULAR; INTRAVENOUS EVERY 6 HOURS PRN
Status: DISCONTINUED | OUTPATIENT
Start: 2022-10-17 | End: 2022-10-17 | Stop reason: HOSPADM

## 2022-10-17 RX ORDER — MIDAZOLAM HYDROCHLORIDE 1 MG/ML
1 INJECTION INTRAMUSCULAR; INTRAVENOUS
Status: DISCONTINUED | OUTPATIENT
Start: 2022-10-17 | End: 2022-10-17 | Stop reason: HOSPADM

## 2022-10-17 RX ORDER — FENTANYL CITRATE 50 UG/ML
50 INJECTION, SOLUTION INTRAMUSCULAR; INTRAVENOUS
Status: DISCONTINUED | OUTPATIENT
Start: 2022-10-17 | End: 2022-10-17 | Stop reason: HOSPADM

## 2022-10-17 RX ORDER — LIDOCAINE HYDROCHLORIDE 20 MG/ML
INJECTION, SOLUTION EPIDURAL; INFILTRATION; INTRACAUDAL; PERINEURAL AS NEEDED
Status: DISCONTINUED | OUTPATIENT
Start: 2022-10-17 | End: 2022-10-17 | Stop reason: SURG

## 2022-10-17 RX ORDER — SODIUM CHLORIDE 0.9 % (FLUSH) 0.9 %
10 SYRINGE (ML) INJECTION EVERY 12 HOURS SCHEDULED
Status: DISCONTINUED | OUTPATIENT
Start: 2022-10-17 | End: 2022-10-17 | Stop reason: HOSPADM

## 2022-10-17 RX ORDER — SODIUM CHLORIDE, SODIUM LACTATE, POTASSIUM CHLORIDE, CALCIUM CHLORIDE 600; 310; 30; 20 MG/100ML; MG/100ML; MG/100ML; MG/100ML
125 INJECTION, SOLUTION INTRAVENOUS ONCE
Status: COMPLETED | OUTPATIENT
Start: 2022-10-17 | End: 2022-10-17

## 2022-10-17 RX ORDER — ONDANSETRON 2 MG/ML
4 INJECTION INTRAMUSCULAR; INTRAVENOUS AS NEEDED
Status: DISCONTINUED | OUTPATIENT
Start: 2022-10-17 | End: 2022-10-17 | Stop reason: HOSPADM

## 2022-10-17 RX ORDER — IPRATROPIUM BROMIDE AND ALBUTEROL SULFATE 2.5; .5 MG/3ML; MG/3ML
3 SOLUTION RESPIRATORY (INHALATION) ONCE AS NEEDED
Status: DISCONTINUED | OUTPATIENT
Start: 2022-10-17 | End: 2022-10-17 | Stop reason: HOSPADM

## 2022-10-17 RX ORDER — SODIUM CHLORIDE 0.9 % (FLUSH) 0.9 %
10 SYRINGE (ML) INJECTION AS NEEDED
Status: DISCONTINUED | OUTPATIENT
Start: 2022-10-17 | End: 2022-10-17 | Stop reason: HOSPADM

## 2022-10-17 RX ORDER — OXYCODONE HYDROCHLORIDE AND ACETAMINOPHEN 5; 325 MG/1; MG/1
1 TABLET ORAL ONCE AS NEEDED
Status: DISCONTINUED | OUTPATIENT
Start: 2022-10-17 | End: 2022-10-17 | Stop reason: HOSPADM

## 2022-10-17 RX ORDER — SODIUM CHLORIDE, SODIUM LACTATE, POTASSIUM CHLORIDE, CALCIUM CHLORIDE 600; 310; 30; 20 MG/100ML; MG/100ML; MG/100ML; MG/100ML
100 INJECTION, SOLUTION INTRAVENOUS ONCE AS NEEDED
Status: DISCONTINUED | OUTPATIENT
Start: 2022-10-17 | End: 2022-10-17 | Stop reason: HOSPADM

## 2022-10-17 RX ORDER — PROPOFOL 10 MG/ML
VIAL (ML) INTRAVENOUS CONTINUOUS PRN
Status: DISCONTINUED | OUTPATIENT
Start: 2022-10-17 | End: 2022-10-17 | Stop reason: SURG

## 2022-10-17 RX ADMIN — LIDOCAINE HYDROCHLORIDE 60 MG: 20 INJECTION, SOLUTION EPIDURAL; INFILTRATION; INTRACAUDAL; PERINEURAL at 09:10

## 2022-10-17 RX ADMIN — PROPOFOL 200 MCG/KG/MIN: 10 INJECTION, EMULSION INTRAVENOUS at 09:10

## 2022-10-17 RX ADMIN — SODIUM CHLORIDE, POTASSIUM CHLORIDE, SODIUM LACTATE AND CALCIUM CHLORIDE: 600; 310; 30; 20 INJECTION, SOLUTION INTRAVENOUS at 09:10

## 2022-10-17 NOTE — ANESTHESIA POSTPROCEDURE EVALUATION
Patient: Merle Granados    Procedure Summary     Date: 10/17/22 Room / Location: [facility] OR  /  [facility] OR    Anesthesia Start: 0908 Anesthesia Stop: 0925    Procedure: COLONOSCOPY Diagnosis:       Diverticulitis      (Diverticulitis [K57.92])    Surgeons: Neal Carrillo MD Provider: Anthony Le MD    Anesthesia Type: general ASA Status: 2          Anesthesia Type: general    Vitals  Vitals Value Taken Time   /87 10/17/22 0957   Temp 96.6 °F (35.9 °C) 10/17/22 0927   Pulse 70 10/17/22 0957   Resp 18 10/17/22 0957   SpO2 94 % 10/17/22 0957           Post Anesthesia Care and Evaluation    Patient location during evaluation: PACU  Patient participation: complete - patient participated  Level of consciousness: awake  Pain score: 0  Pain management: adequate    Airway patency: patent  Anesthetic complications: No anesthetic complications  PONV Status: none  Cardiovascular status: hemodynamically stable  Respiratory status: room air  Hydration status: acceptable

## 2022-10-17 NOTE — H&P
Chief complaint  Recent diverticulitis    Subjective     Patient is a 61 y.o. female who presents today for a colonoscopy.  The patient had diverticulitis in May of this year and was successfully treated.  During her clinic visit in July it was reported that her symptoms had resolved.  Patient continues to deny GI symptoms, including rectal bleeding, abdominal pain, diarrhea, and constipation.  She has a history of 1 colon polyp.  Family history is positive for her mother and maternal aunts having colon polyps.  No family history of colon cancer.  Medical, surgical, and social histories were reviewed and are listed below.      Review of Systems  Review of Systems - General ROS: negative for - weight loss  Psychological ROS: negative for - behavioral disorder  Ophthalmic ROS: negative for - dry eyes  ENT ROS: negative for - vertigo or vocal changes  Hematological and Lymphatic ROS: negative for - jaundice or swollen lymph nodes  Respiratory ROS: negative for - sputum changes or stridor  Cardiovascular ROS: negative for - irregular heartbeat or murmur  Gastrointestinal ROS: negative for - blood in stools or change in stools  Genitourinary ROS: negative for - hematuria or incontinence  Musculoskeletal ROS: negative for - gait disturbance      History  Past Medical History:   Diagnosis Date   • Arthritis     dhruv knees   • Hyperlipidemia    • Skin cancer     left arm     Past Surgical History:   Procedure Laterality Date   • COLONOSCOPY     • TUBAL ABDOMINAL LIGATION       Family History   Problem Relation Age of Onset   • Colon cancer Neg Hx      Social History     Tobacco Use   • Smoking status: Never   • Smokeless tobacco: Never   Vaping Use   • Vaping Use: Never used   Substance Use Topics   • Alcohol use: No   • Drug use: No     Medications Prior to Admission   Medication Sig Dispense Refill Last Dose   • ALPRAZolam (XANAX) 0.5 MG tablet Take 0.5 mg by mouth 2 (Two) Times a Day As Needed for Anxiety.    10/16/2022   • baclofen (LIORESAL) 10 MG tablet Take 10 mg by mouth 2 (Two) Times a Day As Needed for Muscle Spasms.   Past Week   • ibuprofen (ADVIL,MOTRIN) 600 MG tablet Take 600 mg by mouth Every 8 (Eight) Hours As Needed for Mild Pain  or Headache.   Past Week   • pantoprazole (PROTONIX) 40 MG EC tablet Take 40 mg by mouth Daily.   10/16/2022   • rosuvastatin (CRESTOR) 10 MG tablet Take 10 mg by mouth Every Night.   10/16/2022     Allergies:  Patient has no known allergies.    Objective     Vital Signs  Temp:  [97.8 °F (36.6 °C)] 97.8 °F (36.6 °C)  Heart Rate:  [87] 87  Resp:  [18] 18  BP: (159)/(88) 159/88    Physical Exam  General:  This is a WD pleasant female in no acute distress  Vital signs: Stable, afebrile  HEENT exam:  WNL. Sclerae are anicteric.  EOMI  Neck: Supple, FROM.  No JVD.  Trachea midline  Lungs:  Respiratory effort normal. Auscultation: Clear, without wheezes, rhonchi, rales  Heart:  Regular rate and rhythm, without murmur, gallop, rub.  No pedal edema  Abdomen:  No tenderness or palpable masses;  Bowel sounds normal  Musculoskeletal: Muscle strength/tone is normal.    Psych:  Alert, oriented x 3.  Mood and affect are appropriate  Skin:  Warm with good turgor.  Without rash or lesion  Extremities:  Examination of the extremities revealed no cyanosis, clubbing or edema.    Results Review:                     Invalid input(s): PROTCrCl cannot be calculated (Patient's most recent lab result is older than the maximum 30 days allowed.).  No results found for: AMMONIA      No results found for: BLOODCX  No results found for: URINECX  No results found for: WOUNDCX  No results found for: STOOLCX    Imaging:  Imaging Results (Last 24 Hours)     ** No results found for the last 24 hours. **                Impression:  Patient Active Problem List   Diagnosis Code   • Tick bite W57.XXXA   • Diverticulitis K57.92       Assessment:  1.  History of diverticulitis  2.  History of colon polyp  3.  Family  history of colon polyps    Plan:  The patient will undergo a colonoscopy today.  She voiced understanding and agreement of the procedure.    GERA Betts  10/17/22  07:56 EDT

## 2022-10-17 NOTE — ANESTHESIA PREPROCEDURE EVALUATION
Anesthesia Evaluation     Patient summary reviewed and Nursing notes reviewed   no history of anesthetic complications:  NPO Solid Status: > 8 hours  NPO Liquid Status: > 8 hours           Airway   Mallampati: I  TM distance: >3 FB  Dental - normal exam     Pulmonary - negative pulmonary ROS    breath sounds clear to auscultation  Cardiovascular   Exercise tolerance: good (4-7 METS)    Rhythm: regular    (+) hyperlipidemia,       Neuro/Psych- negative ROS  GI/Hepatic/Renal/Endo - negative ROS     Musculoskeletal     Abdominal     Abdomen: soft.   Substance History - negative use     OB/GYN negative ob/gyn ROS         Other   arthritis,    history of cancer (melanoma) remission                    Anesthesia Plan    ASA 2     general     intravenous induction     Anesthetic plan, risks, benefits, and alternatives have been provided, discussed and informed consent has been obtained with: patient.    Use of blood products discussed with consented to blood products.   Plan discussed with CRNA.        CODE STATUS:

## 2024-04-08 ENCOUNTER — PRE-ADMISSION TESTING (OUTPATIENT)
Dept: PREADMISSION TESTING | Facility: HOSPITAL | Age: 64
End: 2024-04-08
Payer: MEDICAID

## 2024-04-08 ENCOUNTER — HOSPITAL ENCOUNTER (OUTPATIENT)
Dept: GENERAL RADIOLOGY | Facility: HOSPITAL | Age: 64
Discharge: HOME | End: 2024-04-08
Payer: MEDICAID

## 2024-04-08 VITALS — HEIGHT: 61 IN | BODY MASS INDEX: 24.72 KG/M2 | WEIGHT: 130.95 LBS

## 2024-04-08 DIAGNOSIS — Z01.89 LABORATORY TEST: Primary | ICD-10-CM

## 2024-04-08 LAB
ABO GROUP BLD: NORMAL
ALBUMIN SERPL-MCNC: 4.7 G/DL (ref 3.5–5.2)
ALBUMIN/GLOB SERPL: 1.8 G/DL
ALP SERPL-CCNC: 73 U/L (ref 39–117)
ALT SERPL W P-5'-P-CCNC: 20 U/L (ref 1–33)
ANION GAP SERPL CALCULATED.3IONS-SCNC: 8 MMOL/L (ref 5–15)
AST SERPL-CCNC: 24 U/L (ref 1–32)
BILIRUB SERPL-MCNC: 0.3 MG/DL (ref 0–1.2)
BUN SERPL-MCNC: 14 MG/DL (ref 8–23)
BUN/CREAT SERPL: 18.9 (ref 7–25)
CALCIUM SPEC-SCNC: 9.7 MG/DL (ref 8.6–10.5)
CHLORIDE SERPL-SCNC: 104 MMOL/L (ref 98–107)
CO2 SERPL-SCNC: 29 MMOL/L (ref 22–29)
CREAT SERPL-MCNC: 0.74 MG/DL (ref 0.57–1)
DEPRECATED RDW RBC AUTO: 45 FL (ref 37–54)
EGFRCR SERPLBLD CKD-EPI 2021: 90.5 ML/MIN/1.73
ERYTHROCYTE [DISTWIDTH] IN BLOOD BY AUTOMATED COUNT: 13.7 % (ref 12.3–15.4)
GLOBULIN UR ELPH-MCNC: 2.6 GM/DL
GLUCOSE SERPL-MCNC: 119 MG/DL (ref 65–99)
HBA1C MFR BLD: 5.7 % (ref 4.8–5.6)
HCT VFR BLD AUTO: 42.8 % (ref 34–46.6)
HGB BLD-MCNC: 13.7 G/DL (ref 12–15.9)
MCH RBC QN AUTO: 28.8 PG (ref 26.6–33)
MCHC RBC AUTO-ENTMCNC: 32 G/DL (ref 31.5–35.7)
MCV RBC AUTO: 89.9 FL (ref 79–97)
PLATELET # BLD AUTO: 201 10*3/MM3 (ref 140–450)
PMV BLD AUTO: 11.3 FL (ref 6–12)
POTASSIUM SERPL-SCNC: 3.4 MMOL/L (ref 3.5–5.2)
PROT SERPL-MCNC: 7.3 G/DL (ref 6–8.5)
RBC # BLD AUTO: 4.76 10*6/MM3 (ref 3.77–5.28)
RH BLD: POSITIVE
SODIUM SERPL-SCNC: 141 MMOL/L (ref 136–145)
WBC NRBC COR # BLD AUTO: 7.23 10*3/MM3 (ref 3.4–10.8)

## 2024-04-08 PROCEDURE — 36415 COLL VENOUS BLD VENIPUNCTURE: CPT

## 2024-04-08 PROCEDURE — 83036 HEMOGLOBIN GLYCOSYLATED A1C: CPT

## 2024-04-08 PROCEDURE — 93005 ELECTROCARDIOGRAM TRACING: CPT

## 2024-04-08 PROCEDURE — 93010 ELECTROCARDIOGRAM REPORT: CPT | Performed by: INTERNAL MEDICINE

## 2024-04-08 PROCEDURE — 86900 BLOOD TYPING SEROLOGIC ABO: CPT

## 2024-04-08 PROCEDURE — 71046 X-RAY EXAM CHEST 2 VIEWS: CPT

## 2024-04-08 PROCEDURE — 80053 COMPREHEN METABOLIC PANEL: CPT

## 2024-04-08 PROCEDURE — 86901 BLOOD TYPING SEROLOGIC RH(D): CPT

## 2024-04-08 PROCEDURE — 85027 COMPLETE CBC AUTOMATED: CPT

## 2024-04-08 NOTE — PAT
An arrival time for procedure was not provided during PAT visit. If patient had any questions or concerns about their arrival time, they were instructed to contact their surgeon/physician.  Additionally, if the patient referred to an arrival time that was acquired from their my chart account, patient was encouraged to verify that time with their surgeon/physician. Arrival times are NOT provided in Pre Admission Testing Department.    Patient viewed general PAT education video as instructed in their preoperative information received from their surgeon.  Patient stated the general PAT education video was viewed in its entirety and survey completed.  Copies of PAT general education handouts (Incentive Spirometry, Meds to Beds Program, Patient Belongings, Pre-op skin preparation instructions, Blood Glucose testing, Visitor policy, Surgery FAQ, Code H) distributed to patient if not printed. Education related to the PAT pass and skin preparation for surgery (if applicable) completed in PAT as a reinforcement to PAT education video. Patient instructed to return PAT pass provided today as well as completed skin preparation sheet (if applicable) on the day of procedure.     Additionally if patient had not viewed video yet but intended to view it at home or in our waiting area, then referred them to the handout with QR code/link provided during PAT visit.  Instructed patient to complete survey after viewing the video in its entirety.  Encouraged patient/family to read PAT general education handouts thoroughly and notify PAT staff with any questions or concerns. Patient verbalized understanding of all information and priority content.    Too early to draw type and screen in PAT.  Please obtain blood bank specimen in pre-op on the day of surgery.    Patient instructed to drink 20 ounces of Gatorade or Gatorlyte (if diabetic) and it needs to be completed 1 hour (for Main OR patients) or 2 hours (scheduled  section & [facility]  "patients) before given arrival time for procedure (NO RED Gatorade and NO Gatorade Zero).    Patient verbalized understanding.    Ten (8 ounce) Impact Advanced Recovery Nutritional Drinks distributed to patient from Pre Admission Testing Department.  Verbal and written instructions given that patient must consume two (8 ounce) Impact drinks a day for five days before surgery.  Flavoring tip sheet provided as well the brochure \"Go in Stronger. Get Home Sooner\" that explains benefits of nutritional drinks to enhance recovery. Patient/family verbalized understanding.     Patient denies any current skin issues.     Patient to apply Chlorhexadine wipes  to surgical area (as instructed) the night before procedure and the AM of procedure. Wipes provided.    EKG IN CHART    Patient directed to Radiology Department for CXR after Pre Admission Testing Appointment.       "

## 2024-04-09 LAB
QT INTERVAL: 360 MS
QTC INTERVAL: 428 MS

## 2024-04-19 ENCOUNTER — ANESTHESIA EVENT (OUTPATIENT)
Dept: PERIOP | Facility: HOSPITAL | Age: 64
End: 2024-04-19
Payer: MEDICAID

## 2024-04-19 RX ORDER — SODIUM CHLORIDE 0.9 % (FLUSH) 0.9 %
10 SYRINGE (ML) INJECTION AS NEEDED
Status: CANCELLED | OUTPATIENT
Start: 2024-04-19

## 2024-04-19 RX ORDER — FAMOTIDINE 10 MG/ML
20 INJECTION, SOLUTION INTRAVENOUS ONCE
Status: CANCELLED | OUTPATIENT
Start: 2024-04-19 | End: 2024-04-19

## 2024-04-19 RX ORDER — SODIUM CHLORIDE 9 MG/ML
40 INJECTION, SOLUTION INTRAVENOUS AS NEEDED
Status: CANCELLED | OUTPATIENT
Start: 2024-04-19

## 2024-04-19 NOTE — ANESTHESIA PREPROCEDURE EVALUATION
Anesthesia Evaluation     Patient summary reviewed and Nursing notes reviewed   no history of anesthetic complications:   NPO Solid Status: > 8 hours  NPO Liquid Status: > 2 hours           Airway   Mallampati: II  TM distance: >3 FB  Neck ROM: full  Possible difficult intubation  Dental - normal exam     Pulmonary - normal exam   (-) COPD, asthma, sleep apnea, no home oxygen  Cardiovascular - normal exam  Exercise tolerance: good (4-7 METS)    ECG reviewed    (+) hyperlipidemia  (-) valvular problems/murmurs, dysrhythmias, angina, cardiac stents      Neuro/Psych  (+) psychiatric history  (-) seizures, TIA  GI/Hepatic/Renal/Endo    (+) GERD well controlled  (-) no renal disease, diabetes, no thyroid disorder    Musculoskeletal     Abdominal    Substance History      OB/GYN          Other   arthritis,   history of cancer    ROS/Med Hx Other: Hx melanoma  Diverticulitis  Hgb 13.7 k 3.4               Anesthesia Plan    ASA 2     general with block     (Risks and benefits of general anesthesia discussed with patient (including MI, CVA, death, recall, aspiration, oropharyngeal/dental damage), questions answered, agreeable to proceed.   Benefits and risks of TAP nerve block/catheter discussed with patient ((including failed block, damage to nerve/nearby structures, intravascular injection)); questions answered, agreeable to proceed.        )  intravenous induction     Anesthetic plan, risks, benefits, and alternatives have been provided, discussed and informed consent has been obtained with: patient.    Use of blood products discussed with patient  Consented to blood products.    Plan discussed with CRNA.    CODE STATUS:

## 2024-04-22 ENCOUNTER — HOSPITAL ENCOUNTER (INPATIENT)
Facility: HOSPITAL | Age: 64
LOS: 2 days | Discharge: HOME | End: 2024-04-24
Attending: STUDENT IN AN ORGANIZED HEALTH CARE EDUCATION/TRAINING PROGRAM | Admitting: STUDENT IN AN ORGANIZED HEALTH CARE EDUCATION/TRAINING PROGRAM
Payer: MEDICAID

## 2024-04-22 ENCOUNTER — ANESTHESIA EVENT CONVERTED (OUTPATIENT)
Dept: ANESTHESIOLOGY | Facility: HOSPITAL | Age: 64
End: 2024-04-22
Payer: MEDICAID

## 2024-04-22 ENCOUNTER — ANESTHESIA (OUTPATIENT)
Dept: PERIOP | Facility: HOSPITAL | Age: 64
End: 2024-04-22
Payer: MEDICAID

## 2024-04-22 DIAGNOSIS — Z90.49 S/P COLECTOMY: Primary | ICD-10-CM

## 2024-04-22 DIAGNOSIS — K57.92 DIVERTICULITIS: ICD-10-CM

## 2024-04-22 PROBLEM — K57.30 DIVERTICULAR DISEASE OF COLON: Status: ACTIVE | Noted: 2024-04-22

## 2024-04-22 PROBLEM — E78.5 HYPERLIPIDEMIA: Status: ACTIVE | Noted: 2024-04-22

## 2024-04-22 LAB
ABO GROUP BLD: NORMAL
BLD GP AB SCN SERPL QL: NEGATIVE
GLUCOSE BLDC GLUCOMTR-MCNC: 150 MG/DL (ref 70–130)
POTASSIUM SERPL-SCNC: 3.5 MMOL/L (ref 3.5–5.2)
RH BLD: POSITIVE
T&S EXPIRATION DATE: NORMAL

## 2024-04-22 PROCEDURE — 25810000003 SODIUM CHLORIDE PER 500 ML: Performed by: STUDENT IN AN ORGANIZED HEALTH CARE EDUCATION/TRAINING PROGRAM

## 2024-04-22 PROCEDURE — 25010000002 MIDAZOLAM PER 1 MG: Performed by: ANESTHESIOLOGY

## 2024-04-22 PROCEDURE — 25010000002 ONDANSETRON PER 1 MG: Performed by: STUDENT IN AN ORGANIZED HEALTH CARE EDUCATION/TRAINING PROGRAM

## 2024-04-22 PROCEDURE — 25010000002 PROPOFOL 10 MG/ML EMULSION: Performed by: ANESTHESIOLOGY

## 2024-04-22 PROCEDURE — 0DBN4ZZ EXCISION OF SIGMOID COLON, PERCUTANEOUS ENDOSCOPIC APPROACH: ICD-10-PCS | Performed by: STUDENT IN AN ORGANIZED HEALTH CARE EDUCATION/TRAINING PROGRAM

## 2024-04-22 PROCEDURE — 82948 REAGENT STRIP/BLOOD GLUCOSE: CPT

## 2024-04-22 PROCEDURE — 0DTP4ZZ RESECTION OF RECTUM, PERCUTANEOUS ENDOSCOPIC APPROACH: ICD-10-PCS | Performed by: STUDENT IN AN ORGANIZED HEALTH CARE EDUCATION/TRAINING PROGRAM

## 2024-04-22 PROCEDURE — 25810000003 SODIUM CHLORIDE 0.9 % SOLUTION 250 ML FLEX CONT: Performed by: ANESTHESIOLOGY

## 2024-04-22 PROCEDURE — P9041 ALBUMIN (HUMAN),5%, 50ML: HCPCS | Performed by: ANESTHESIOLOGY

## 2024-04-22 PROCEDURE — 84132 ASSAY OF SERUM POTASSIUM: CPT | Performed by: ANESTHESIOLOGY

## 2024-04-22 PROCEDURE — 25010000002 ALBUMIN HUMAN 5% PER 50 ML: Performed by: ANESTHESIOLOGY

## 2024-04-22 PROCEDURE — 86850 RBC ANTIBODY SCREEN: CPT | Performed by: STUDENT IN AN ORGANIZED HEALTH CARE EDUCATION/TRAINING PROGRAM

## 2024-04-22 PROCEDURE — 86900 BLOOD TYPING SEROLOGIC ABO: CPT | Performed by: STUDENT IN AN ORGANIZED HEALTH CARE EDUCATION/TRAINING PROGRAM

## 2024-04-22 PROCEDURE — 25810000003 LACTATED RINGERS PER 1000 ML: Performed by: ANESTHESIOLOGY

## 2024-04-22 PROCEDURE — 25010000002 BUPIVACAINE (PF) 0.25 % SOLUTION: Performed by: ANESTHESIOLOGY

## 2024-04-22 PROCEDURE — 88307 TISSUE EXAM BY PATHOLOGIST: CPT | Performed by: STUDENT IN AN ORGANIZED HEALTH CARE EDUCATION/TRAINING PROGRAM

## 2024-04-22 PROCEDURE — 25010000002 ERTAPENEM PER 500 MG: Performed by: STUDENT IN AN ORGANIZED HEALTH CARE EDUCATION/TRAINING PROGRAM

## 2024-04-22 PROCEDURE — 25010000002 SUGAMMADEX 200 MG/2ML SOLUTION: Performed by: ANESTHESIOLOGY

## 2024-04-22 PROCEDURE — 25010000002 FENTANYL CITRATE (PF) 100 MCG/2ML SOLUTION: Performed by: ANESTHESIOLOGY

## 2024-04-22 PROCEDURE — 25010000002 INDOCYANINE GREEN 25 MG RECONSTITUTED SOLUTION: Performed by: ANESTHESIOLOGY

## 2024-04-22 PROCEDURE — 25010000002 MORPHINE PER 10 MG: Performed by: STUDENT IN AN ORGANIZED HEALTH CARE EDUCATION/TRAINING PROGRAM

## 2024-04-22 PROCEDURE — 25010000002 PHENYLEPHRINE 10 MG/ML SOLUTION: Performed by: ANESTHESIOLOGY

## 2024-04-22 PROCEDURE — 25010000002 DEXAMETHASONE PER 1 MG: Performed by: ANESTHESIOLOGY

## 2024-04-22 PROCEDURE — 25010000002 PHENYLEPHRINE 10 MG/ML SOLUTION 1 ML VIAL: Performed by: ANESTHESIOLOGY

## 2024-04-22 PROCEDURE — 25010000002 DEXAMETHASONE SODIUM PHOSPHATE 10 MG/ML SOLUTION: Performed by: ANESTHESIOLOGY

## 2024-04-22 PROCEDURE — 25010000002 ONDANSETRON PER 1 MG: Performed by: ANESTHESIOLOGY

## 2024-04-22 PROCEDURE — 25010000002 HYDROMORPHONE 1 MG/ML SOLUTION

## 2024-04-22 PROCEDURE — 86901 BLOOD TYPING SEROLOGIC RH(D): CPT | Performed by: STUDENT IN AN ORGANIZED HEALTH CARE EDUCATION/TRAINING PROGRAM

## 2024-04-22 PROCEDURE — 8E0W4CZ ROBOTIC ASSISTED PROCEDURE OF TRUNK REGION, PERCUTANEOUS ENDOSCOPIC APPROACH: ICD-10-PCS | Performed by: STUDENT IN AN ORGANIZED HEALTH CARE EDUCATION/TRAINING PROGRAM

## 2024-04-22 PROCEDURE — 25010000002 HEPARIN (PORCINE) PER 1000 UNITS: Performed by: STUDENT IN AN ORGANIZED HEALTH CARE EDUCATION/TRAINING PROGRAM

## 2024-04-22 PROCEDURE — 25010000002 FENTANYL CITRATE (PF) 50 MCG/ML SOLUTION

## 2024-04-22 DEVICE — STAPLER 60 RELOAD WHITE
Type: IMPLANTABLE DEVICE | Site: ABDOMEN | Status: FUNCTIONAL
Brand: SUREFORM

## 2024-04-22 DEVICE — ECHELON CIRCULAR POWERED STAPLER
Type: IMPLANTABLE DEVICE | Site: ABDOMEN | Status: FUNCTIONAL
Brand: ECHELON CIRCULAR

## 2024-04-22 DEVICE — STAPLER 60 RELOAD BLUE
Type: IMPLANTABLE DEVICE | Site: ABDOMEN | Status: FUNCTIONAL
Brand: SUREFORM

## 2024-04-22 RX ORDER — ROCURONIUM BROMIDE 10 MG/ML
INJECTION, SOLUTION INTRAVENOUS AS NEEDED
Status: DISCONTINUED | OUTPATIENT
Start: 2024-04-22 | End: 2024-04-22 | Stop reason: SURG

## 2024-04-22 RX ORDER — SODIUM CHLORIDE, SODIUM LACTATE, POTASSIUM CHLORIDE, CALCIUM CHLORIDE 600; 310; 30; 20 MG/100ML; MG/100ML; MG/100ML; MG/100ML
9 INJECTION, SOLUTION INTRAVENOUS CONTINUOUS
Status: DISCONTINUED | OUTPATIENT
Start: 2024-04-22 | End: 2024-04-22

## 2024-04-22 RX ORDER — INDOCYANINE GREEN AND WATER 25 MG
KIT INJECTION AS NEEDED
Status: DISCONTINUED | OUTPATIENT
Start: 2024-04-22 | End: 2024-04-22 | Stop reason: SURG

## 2024-04-22 RX ORDER — PANTOPRAZOLE SODIUM 40 MG/1
40 TABLET, DELAYED RELEASE ORAL DAILY
Status: DISCONTINUED | OUTPATIENT
Start: 2024-04-22 | End: 2024-04-24 | Stop reason: HOSPADM

## 2024-04-22 RX ORDER — FENTANYL CITRATE 50 UG/ML
INJECTION, SOLUTION INTRAMUSCULAR; INTRAVENOUS
Status: COMPLETED
Start: 2024-04-22 | End: 2024-04-22

## 2024-04-22 RX ORDER — LIDOCAINE HYDROCHLORIDE 10 MG/ML
INJECTION, SOLUTION EPIDURAL; INFILTRATION; INTRACAUDAL; PERINEURAL AS NEEDED
Status: DISCONTINUED | OUTPATIENT
Start: 2024-04-22 | End: 2024-04-22 | Stop reason: SURG

## 2024-04-22 RX ORDER — ONDANSETRON 2 MG/ML
INJECTION INTRAMUSCULAR; INTRAVENOUS AS NEEDED
Status: DISCONTINUED | OUTPATIENT
Start: 2024-04-22 | End: 2024-04-22 | Stop reason: SURG

## 2024-04-22 RX ORDER — PREGABALIN 75 MG/1
75 CAPSULE ORAL ONCE
Status: COMPLETED | OUTPATIENT
Start: 2024-04-22 | End: 2024-04-22

## 2024-04-22 RX ORDER — ALVIMOPAN 12 MG/1
12 CAPSULE ORAL ONCE
Status: COMPLETED | OUTPATIENT
Start: 2024-04-22 | End: 2024-04-22

## 2024-04-22 RX ORDER — DEXAMETHASONE SODIUM PHOSPHATE 4 MG/ML
INJECTION, SOLUTION INTRA-ARTICULAR; INTRALESIONAL; INTRAMUSCULAR; INTRAVENOUS; SOFT TISSUE AS NEEDED
Status: DISCONTINUED | OUTPATIENT
Start: 2024-04-22 | End: 2024-04-22 | Stop reason: SURG

## 2024-04-22 RX ORDER — MELOXICAM 15 MG/1
15 TABLET ORAL ONCE
Status: COMPLETED | OUTPATIENT
Start: 2024-04-22 | End: 2024-04-22

## 2024-04-22 RX ORDER — ACETAMINOPHEN 500 MG
1000 TABLET ORAL ONCE
Status: COMPLETED | OUTPATIENT
Start: 2024-04-22 | End: 2024-04-22

## 2024-04-22 RX ORDER — ENOXAPARIN SODIUM 100 MG/ML
40 INJECTION SUBCUTANEOUS DAILY
Status: DISCONTINUED | OUTPATIENT
Start: 2024-04-23 | End: 2024-04-24 | Stop reason: HOSPADM

## 2024-04-22 RX ORDER — ALVIMOPAN 12 MG/1
12 CAPSULE ORAL 2 TIMES DAILY
Status: DISCONTINUED | OUTPATIENT
Start: 2024-04-23 | End: 2024-04-23

## 2024-04-22 RX ORDER — PHENYLEPHRINE HYDROCHLORIDE 10 MG/ML
INJECTION INTRAVENOUS AS NEEDED
Status: DISCONTINUED | OUTPATIENT
Start: 2024-04-22 | End: 2024-04-22 | Stop reason: SURG

## 2024-04-22 RX ORDER — ALBUMIN, HUMAN INJ 5% 5 %
SOLUTION INTRAVENOUS CONTINUOUS PRN
Status: DISCONTINUED | OUTPATIENT
Start: 2024-04-22 | End: 2024-04-22 | Stop reason: SURG

## 2024-04-22 RX ORDER — LIDOCAINE HYDROCHLORIDE 10 MG/ML
0.5 INJECTION, SOLUTION EPIDURAL; INFILTRATION; INTRACAUDAL; PERINEURAL ONCE AS NEEDED
Status: COMPLETED | OUTPATIENT
Start: 2024-04-22 | End: 2024-04-22

## 2024-04-22 RX ORDER — MIDAZOLAM HYDROCHLORIDE 1 MG/ML
1 INJECTION INTRAMUSCULAR; INTRAVENOUS
Status: DISCONTINUED | OUTPATIENT
Start: 2024-04-22 | End: 2024-04-22 | Stop reason: HOSPADM

## 2024-04-22 RX ORDER — MIDAZOLAM HYDROCHLORIDE 1 MG/ML
2 INJECTION INTRAMUSCULAR; INTRAVENOUS ONCE
Status: COMPLETED | OUTPATIENT
Start: 2024-04-22 | End: 2024-04-22

## 2024-04-22 RX ORDER — HYDROMORPHONE HYDROCHLORIDE 1 MG/ML
0.5 INJECTION, SOLUTION INTRAMUSCULAR; INTRAVENOUS; SUBCUTANEOUS
Status: DISCONTINUED | OUTPATIENT
Start: 2024-04-22 | End: 2024-04-22 | Stop reason: HOSPADM

## 2024-04-22 RX ORDER — ULTRASOUND COUPLING MEDIUM
GEL (GRAM) TOPICAL AS NEEDED
Status: DISCONTINUED | OUTPATIENT
Start: 2024-04-22 | End: 2024-04-22 | Stop reason: HOSPADM

## 2024-04-22 RX ORDER — FAMOTIDINE 20 MG/1
20 TABLET, FILM COATED ORAL ONCE
Status: COMPLETED | OUTPATIENT
Start: 2024-04-22 | End: 2024-04-22

## 2024-04-22 RX ORDER — DROPERIDOL 2.5 MG/ML
0.62 INJECTION, SOLUTION INTRAMUSCULAR; INTRAVENOUS ONCE AS NEEDED
Status: DISCONTINUED | OUTPATIENT
Start: 2024-04-22 | End: 2024-04-22 | Stop reason: HOSPADM

## 2024-04-22 RX ORDER — EPHEDRINE SULFATE 50 MG/ML
INJECTION INTRAVENOUS AS NEEDED
Status: DISCONTINUED | OUTPATIENT
Start: 2024-04-22 | End: 2024-04-22 | Stop reason: SURG

## 2024-04-22 RX ORDER — LABETALOL HYDROCHLORIDE 5 MG/ML
10 INJECTION, SOLUTION INTRAVENOUS EVERY 4 HOURS PRN
Status: DISCONTINUED | OUTPATIENT
Start: 2024-04-22 | End: 2024-04-24 | Stop reason: HOSPADM

## 2024-04-22 RX ORDER — BUPIVACAINE HYDROCHLORIDE 2.5 MG/ML
INJECTION, SOLUTION EPIDURAL; INFILTRATION; INTRACAUDAL
Status: COMPLETED | OUTPATIENT
Start: 2024-04-22 | End: 2024-04-22

## 2024-04-22 RX ORDER — FENTANYL CITRATE 50 UG/ML
INJECTION, SOLUTION INTRAMUSCULAR; INTRAVENOUS AS NEEDED
Status: DISCONTINUED | OUTPATIENT
Start: 2024-04-22 | End: 2024-04-22 | Stop reason: SURG

## 2024-04-22 RX ORDER — OXYCODONE HYDROCHLORIDE 5 MG/1
5 TABLET ORAL EVERY 4 HOURS PRN
Status: DISCONTINUED | OUTPATIENT
Start: 2024-04-22 | End: 2024-04-24 | Stop reason: HOSPADM

## 2024-04-22 RX ORDER — SODIUM CHLORIDE 9 MG/ML
INJECTION, SOLUTION INTRAVENOUS AS NEEDED
Status: DISCONTINUED | OUTPATIENT
Start: 2024-04-22 | End: 2024-04-22 | Stop reason: HOSPADM

## 2024-04-22 RX ORDER — DEXAMETHASONE SODIUM PHOSPHATE 10 MG/ML
INJECTION, SOLUTION INTRAMUSCULAR; INTRAVENOUS
Status: COMPLETED | OUTPATIENT
Start: 2024-04-22 | End: 2024-04-22

## 2024-04-22 RX ORDER — BACLOFEN 10 MG/1
10 TABLET ORAL 3 TIMES DAILY
Status: DISCONTINUED | OUTPATIENT
Start: 2024-04-22 | End: 2024-04-24 | Stop reason: HOSPADM

## 2024-04-22 RX ORDER — DIAZEPAM 5 MG/1
5 TABLET ORAL EVERY 6 HOURS PRN
Status: DISCONTINUED | OUTPATIENT
Start: 2024-04-22 | End: 2024-04-24 | Stop reason: HOSPADM

## 2024-04-22 RX ORDER — ONDANSETRON 2 MG/ML
4 INJECTION INTRAMUSCULAR; INTRAVENOUS EVERY 6 HOURS PRN
Status: DISCONTINUED | OUTPATIENT
Start: 2024-04-22 | End: 2024-04-22 | Stop reason: SDUPTHER

## 2024-04-22 RX ORDER — PROPOFOL 10 MG/ML
VIAL (ML) INTRAVENOUS AS NEEDED
Status: DISCONTINUED | OUTPATIENT
Start: 2024-04-22 | End: 2024-04-22 | Stop reason: SURG

## 2024-04-22 RX ORDER — ONDANSETRON 2 MG/ML
4 INJECTION INTRAMUSCULAR; INTRAVENOUS EVERY 6 HOURS PRN
Status: DISCONTINUED | OUTPATIENT
Start: 2024-04-22 | End: 2024-04-24 | Stop reason: HOSPADM

## 2024-04-22 RX ORDER — ACETAMINOPHEN 500 MG
1000 TABLET ORAL EVERY 6 HOURS
Qty: 16 TABLET | Refills: 0 | Status: DISPENSED | OUTPATIENT
Start: 2024-04-22 | End: 2024-04-24

## 2024-04-22 RX ORDER — ONDANSETRON 4 MG/1
4 TABLET, ORALLY DISINTEGRATING ORAL EVERY 6 HOURS PRN
Status: DISCONTINUED | OUTPATIENT
Start: 2024-04-22 | End: 2024-04-24 | Stop reason: HOSPADM

## 2024-04-22 RX ORDER — ROSUVASTATIN CALCIUM 10 MG/1
10 TABLET, COATED ORAL NIGHTLY
Status: DISCONTINUED | OUTPATIENT
Start: 2024-04-22 | End: 2024-04-24 | Stop reason: HOSPADM

## 2024-04-22 RX ORDER — SCOLOPAMINE TRANSDERMAL SYSTEM 1 MG/1
1 PATCH, EXTENDED RELEASE TRANSDERMAL ONCE
Status: DISCONTINUED | OUTPATIENT
Start: 2024-04-22 | End: 2024-04-22

## 2024-04-22 RX ORDER — HEPARIN SODIUM 5000 [USP'U]/ML
5000 INJECTION, SOLUTION INTRAVENOUS; SUBCUTANEOUS ONCE
Status: COMPLETED | OUTPATIENT
Start: 2024-04-22 | End: 2024-04-22

## 2024-04-22 RX ORDER — MORPHINE SULFATE 2 MG/ML
2 INJECTION, SOLUTION INTRAMUSCULAR; INTRAVENOUS EVERY 4 HOURS PRN
Status: DISCONTINUED | OUTPATIENT
Start: 2024-04-22 | End: 2024-04-24 | Stop reason: HOSPADM

## 2024-04-22 RX ORDER — FENTANYL CITRATE 50 UG/ML
50 INJECTION, SOLUTION INTRAMUSCULAR; INTRAVENOUS
Status: DISCONTINUED | OUTPATIENT
Start: 2024-04-22 | End: 2024-04-22 | Stop reason: HOSPADM

## 2024-04-22 RX ORDER — NALOXONE HCL 0.4 MG/ML
0.4 VIAL (ML) INJECTION
Status: DISCONTINUED | OUTPATIENT
Start: 2024-04-22 | End: 2024-04-24 | Stop reason: HOSPADM

## 2024-04-22 RX ORDER — SODIUM CHLORIDE 0.9 % (FLUSH) 0.9 %
10 SYRINGE (ML) INJECTION EVERY 12 HOURS SCHEDULED
Status: DISCONTINUED | OUTPATIENT
Start: 2024-04-22 | End: 2024-04-22 | Stop reason: HOSPADM

## 2024-04-22 RX ORDER — METHOCARBAMOL 750 MG/1
750 TABLET, FILM COATED ORAL EVERY 8 HOURS SCHEDULED
Status: DISCONTINUED | OUTPATIENT
Start: 2024-04-22 | End: 2024-04-24 | Stop reason: HOSPADM

## 2024-04-22 RX ORDER — DEXTROSE MONOHYDRATE, SODIUM CHLORIDE, AND POTASSIUM CHLORIDE 50; 1.49; 4.5 G/1000ML; G/1000ML; G/1000ML
75 INJECTION, SOLUTION INTRAVENOUS CONTINUOUS
Status: DISCONTINUED | OUTPATIENT
Start: 2024-04-22 | End: 2024-04-23

## 2024-04-22 RX ADMIN — METHOCARBAMOL 750 MG: 750 TABLET ORAL at 20:37

## 2024-04-22 RX ADMIN — HEPARIN SODIUM 5000 UNITS: 5000 INJECTION INTRAVENOUS; SUBCUTANEOUS at 06:47

## 2024-04-22 RX ADMIN — DEXAMETHASONE SODIUM PHOSPHATE 8 MG: 4 INJECTION, SOLUTION INTRA-ARTICULAR; INTRALESIONAL; INTRAMUSCULAR; INTRAVENOUS; SOFT TISSUE at 07:52

## 2024-04-22 RX ADMIN — BUPIVACAINE HYDROCHLORIDE 60 ML: 2.5 INJECTION, SOLUTION EPIDURAL; INFILTRATION; INTRACAUDAL; PERINEURAL at 07:29

## 2024-04-22 RX ADMIN — PROPOFOL 25 MCG/KG/MIN: 10 INJECTION, EMULSION INTRAVENOUS at 07:40

## 2024-04-22 RX ADMIN — DEXAMETHASONE SODIUM PHOSPHATE 4 MG: 10 INJECTION, SOLUTION INTRAMUSCULAR; INTRAVENOUS at 07:29

## 2024-04-22 RX ADMIN — BACLOFEN 10 MG: 10 TABLET ORAL at 20:38

## 2024-04-22 RX ADMIN — PHENYLEPHRINE HYDROCHLORIDE 100 MCG: 10 INJECTION INTRAVENOUS at 08:01

## 2024-04-22 RX ADMIN — FENTANYL CITRATE 50 MCG: 50 INJECTION, SOLUTION INTRAMUSCULAR; INTRAVENOUS at 07:34

## 2024-04-22 RX ADMIN — MIDAZOLAM HYDROCHLORIDE 1 MG: 1 INJECTION, SOLUTION INTRAMUSCULAR; INTRAVENOUS at 07:10

## 2024-04-22 RX ADMIN — MELOXICAM 15 MG: 15 TABLET ORAL at 06:47

## 2024-04-22 RX ADMIN — PHENYLEPHRINE HYDROCHLORIDE 0.2 MCG/KG/MIN: 10 INJECTION INTRAVENOUS at 08:50

## 2024-04-22 RX ADMIN — SODIUM CHLORIDE, POTASSIUM CHLORIDE, SODIUM LACTATE AND CALCIUM CHLORIDE 9 ML/HR: 600; 310; 30; 20 INJECTION, SOLUTION INTRAVENOUS at 06:47

## 2024-04-22 RX ADMIN — LIDOCAINE HYDROCHLORIDE 60 MG: 10 INJECTION, SOLUTION EPIDURAL; INFILTRATION; INTRACAUDAL; PERINEURAL at 07:34

## 2024-04-22 RX ADMIN — OXYCODONE 5 MG: 5 TABLET ORAL at 21:39

## 2024-04-22 RX ADMIN — FAMOTIDINE 20 MG: 20 TABLET, FILM COATED ORAL at 06:47

## 2024-04-22 RX ADMIN — SCOPOLAMINE 1 PATCH: 1.5 PATCH, EXTENDED RELEASE TRANSDERMAL at 06:47

## 2024-04-22 RX ADMIN — INDOCYANINE GREEN AND WATER 7.5 MG: KIT at 09:07

## 2024-04-22 RX ADMIN — FENTANYL CITRATE 50 MCG: 50 INJECTION, SOLUTION INTRAMUSCULAR; INTRAVENOUS at 08:15

## 2024-04-22 RX ADMIN — FENTANYL CITRATE 50 MCG: 50 INJECTION, SOLUTION INTRAMUSCULAR; INTRAVENOUS at 10:34

## 2024-04-22 RX ADMIN — ACETAMINOPHEN 1000 MG: 500 TABLET ORAL at 20:38

## 2024-04-22 RX ADMIN — PREGABALIN 75 MG: 75 CAPSULE ORAL at 06:47

## 2024-04-22 RX ADMIN — FENTANYL CITRATE 50 MCG: 50 INJECTION, SOLUTION INTRAMUSCULAR; INTRAVENOUS at 11:04

## 2024-04-22 RX ADMIN — HYDROMORPHONE HYDROCHLORIDE 0.5 MG: 1 INJECTION, SOLUTION INTRAMUSCULAR; INTRAVENOUS; SUBCUTANEOUS at 10:46

## 2024-04-22 RX ADMIN — ACETAMINOPHEN 1000 MG: 500 TABLET ORAL at 06:47

## 2024-04-22 RX ADMIN — PROPOFOL 150 MG: 10 INJECTION, EMULSION INTRAVENOUS at 07:34

## 2024-04-22 RX ADMIN — ALVIMOPAN 12 MG: 12 CAPSULE ORAL at 06:47

## 2024-04-22 RX ADMIN — ERTAPENEM SODIUM 1000 MG: 1 INJECTION, POWDER, LYOPHILIZED, FOR SOLUTION INTRAMUSCULAR; INTRAVENOUS at 07:39

## 2024-04-22 RX ADMIN — ROSUVASTATIN 10 MG: 10 TABLET, FILM COATED ORAL at 20:38

## 2024-04-22 RX ADMIN — ONDANSETRON 4 MG: 2 INJECTION INTRAMUSCULAR; INTRAVENOUS at 13:15

## 2024-04-22 RX ADMIN — ROCURONIUM BROMIDE 50 MG: 10 INJECTION INTRAVENOUS at 08:07

## 2024-04-22 RX ADMIN — MORPHINE SULFATE 2 MG: 2 INJECTION, SOLUTION INTRAMUSCULAR; INTRAVENOUS at 13:15

## 2024-04-22 RX ADMIN — SUGAMMADEX 200 MG: 100 INJECTION, SOLUTION INTRAVENOUS at 09:57

## 2024-04-22 RX ADMIN — ACETAMINOPHEN 1000 MG: 500 TABLET ORAL at 17:12

## 2024-04-22 RX ADMIN — ONDANSETRON 4 MG: 2 INJECTION INTRAMUSCULAR; INTRAVENOUS at 09:53

## 2024-04-22 RX ADMIN — LIDOCAINE HYDROCHLORIDE 0.5 ML: 10 INJECTION, SOLUTION EPIDURAL; INFILTRATION; INTRACAUDAL; PERINEURAL at 06:56

## 2024-04-22 RX ADMIN — PHENYLEPHRINE HYDROCHLORIDE 200 MCG: 10 INJECTION INTRAVENOUS at 08:45

## 2024-04-22 RX ADMIN — METHOCARBAMOL 750 MG: 750 TABLET ORAL at 17:12

## 2024-04-22 RX ADMIN — POTASSIUM CHLORIDE, DEXTROSE MONOHYDRATE AND SODIUM CHLORIDE 75 ML/HR: 150; 5; 450 INJECTION, SOLUTION INTRAVENOUS at 11:52

## 2024-04-22 RX ADMIN — BACLOFEN 10 MG: 10 TABLET ORAL at 17:12

## 2024-04-22 RX ADMIN — ALBUMIN (HUMAN): 12.5 INJECTION, SOLUTION INTRAVENOUS at 09:22

## 2024-04-22 RX ADMIN — ROCURONIUM BROMIDE 50 MG: 10 INJECTION INTRAVENOUS at 07:34

## 2024-04-22 RX ADMIN — EPHEDRINE SULFATE 10 MG: 50 INJECTION INTRAVENOUS at 08:01

## 2024-04-22 RX ADMIN — EPHEDRINE SULFATE 5 MG: 50 INJECTION INTRAVENOUS at 08:45

## 2024-04-22 RX ADMIN — PHENYLEPHRINE HYDROCHLORIDE 200 MCG: 10 INJECTION INTRAVENOUS at 08:07

## 2024-04-22 NOTE — ANESTHESIA PROCEDURE NOTES
Airway  Urgency: elective    Date/Time: 4/22/2024 7:37 AM  Airway not difficult    General Information and Staff    Patient location during procedure: OR  Anesthesiologist: Noelle Kwok DO  CRNA/CAA: Schirmer, Shelley P, CRNA  SRNA: Gerardo Castrejon SRNA  Indications and Patient Condition  Indications for airway management: airway protection    Preoxygenated: yes  MILS not maintained throughout  Mask difficulty assessment: 1 - vent by mask    Final Airway Details  Final airway type: endotracheal airway      Successful airway: ETT  Cuffed: yes   Successful intubation technique: direct laryngoscopy  Endotracheal tube insertion site: oral  Blade: Alise  Blade size: 3  ETT size (mm): 7.0  Cormack-Lehane Classification: grade I - full view of glottis  Placement verified by: chest auscultation and capnometry   Measured from: lips  ETT/EBT  to lips (cm): 21  Number of attempts at approach: 1  Assessment: lips, teeth, and gum same as pre-op and atraumatic intubation    Additional Comments  Negative epigastric sounds, Breath sound equal bilaterally with symmetric chest rise and fall

## 2024-04-22 NOTE — H&P
Admission      Patient Name: Merle Granados  MRN: [id number]  : 1960  DOS: 2024    Attending: Tico Morley MD    Primary Care Provider: Jakub Triana APRN      Patient Care Team:  Jakub Triana APRN as PCP - General (Family Medicine)    Chief complaint:  Diverticulitis    Subjective   Patient is a pleasant 63 y.o. female presented for scheduled surgery by Dr. Morley.    She has 2 year history of diverticulitis. She had LLQ pain. She had colonoscopy 24 that showed pancolonic diverticulitis.     She had a hospitalization in January for diverticulitis with perforation and abscess. She was on IV abx. No recent fevers, chills or night sweats.      Today she underwent robotic low anterior resection with splenic flexure mobilization, surgery was done under general anesthesia and a block, was tolerated well, she is admitted for the management.    Seen in her room postop, doing well, good pain control, drowsy from not sleeping last night.  No nausea or vomiting and no shortness of breath.    Allergies:  No Known Allergies     Medications Prior to Admission   Medication Sig Dispense Refill Last Dose    ALPRAZolam (XANAX) 0.5 MG tablet Take 1 tablet by mouth 2 (Two) Times a Day As Needed for Anxiety.   2024 at 0500    pantoprazole (PROTONIX) 40 MG EC tablet Take 1 tablet by mouth Daily.   2024    rosuvastatin (CRESTOR) 10 MG tablet Take 1 tablet by mouth Every Night.   2024    baclofen (LIORESAL) 10 MG tablet Take 1 tablet by mouth 2 (Two) Times a Day As Needed for Muscle Spasms.   More than a month    ibuprofen (ADVIL,MOTRIN) 600 MG tablet Take 1 tablet by mouth Every 8 (Eight) Hours As Needed for Mild Pain or Headache.   More than a month         History:   Past Medical History:   Diagnosis Date    Arthritis     dhruv knees    Diverticulitis     Hyperlipidemia     Skin cancer     left arm     Past Surgical History:   Procedure Laterality Date    COLONOSCOPY       "COLONOSCOPY N/A 10/17/2022    Procedure: COLONOSCOPY;  Surgeon: Neal Carrillo MD;  Location: [facility] OR;  Service: Gastroenterology;  Laterality: N/A;    SKIN CANCER EXCISION      TUBAL ABDOMINAL LIGATION       Family History   Problem Relation Age of Onset    Colon cancer Neg Hx      Social History     Tobacco Use    Smoking status: Never    Smokeless tobacco: Never   Vaping Use    Vaping status: Never Used   Substance Use Topics    Alcohol use: No    Drug use: No   .  Lives with her .    Review of Systems  Pertinent items are noted in HPI    Vital Signs  /85 (BP Location: Right arm, Patient Position: Lying)   Pulse 80   Temp 97.7 °F (36.5 °C) (Oral)   Resp 14   Ht 154.9 cm (61\")   Wt 59.4 kg (130 lb 15.3 oz)   SpO2 93%   BMI 24.74 kg/m²     Physical Exam:    General Appearance:    Alert, cooperative, in no acute distress   Head:    Normocephalic, without obvious abnormality, atraumatic   Eyes:            Lids and lashes normal, conjunctivae and sclerae normal, no   icterus, no pallor, corneas clear   Ears:    Ears appear intact with no abnormalities noted   Throat:   No oral lesions, no thrush, oral mucosa moist   Neck:   No adenopathy, supple, trachea midline, no thyromegaly         Lungs:     Clear to auscultation,respirations regular, even and       unlabored. No wheezes or rales.    Heart:    Regular rhythm and normal rate, normal S1 and S2, no murmur    Abdomen:      Soft, benign, clean incisions.   Genitalia:    Deferred  Gan with clear yellow urine.   Extremities:   Moves all extremities well, no edema, no cyanosis, no              redness   Pulses:   Pulses palpable and equal bilaterally   Skin:   No bleeding, bruising or rash   Neurologic:   Cranial nerves 2 - 12 grossly intact,             Invalid input(s): \"NEUTOPHILPCT\"  Results from last 7 days   Lab Units 04/22/24  0644   POTASSIUM mmol/L 3.5     Lab Results   Component Value Date    HGBA1C 5.70 (H) " 04/08/2024      Latest Reference Range & Units 04/08/24 14:13   Sodium 136 - 145 mmol/L 141   Potassium 3.5 - 5.2 mmol/L 3.4 (L)   Chloride 98 - 107 mmol/L 104   CO2 22.0 - 29.0 mmol/L 29.0   Anion Gap 5.0 - 15.0 mmol/L 8.0   BUN 8 - 23 mg/dL 14   Creatinine 0.57 - 1.00 mg/dL 0.74   BUN/Creatinine Ratio 7.0 - 25.0  18.9   eGFR >60.0 mL/min/1.73 90.5   Glucose 65 - 99 mg/dL 119 (H)   Calcium 8.6 - 10.5 mg/dL 9.7   (L): Data is abnormally low  (H): Data is abnormally high.     Latest Reference Range & Units 04/08/24 14:13   WBC 3.40 - 10.80 10*3/mm3 7.23   RBC 3.77 - 5.28 10*6/mm3 4.76   Hemoglobin 12.0 - 15.9 g/dL 13.7   Hematocrit 34.0 - 46.6 % 42.8   Platelets 140 - 450 10*3/mm3 201   RDW 12.3 - 15.4 % 13.7   MCV 79.0 - 97.0 fL 89.9   MCH 26.6 - 33.0 pg 28.8   MCHC 31.5 - 35.7 g/dL 32.0   MPV 6.0 - 12.0 fL 11.3   RDW-SD 37.0 - 54.0 fl 45.0     Assessment and Plan:       S/P colectomy ( Robotic LAR with SFM)    Diverticular disease of colon    Hyperlipidemia  GERD    Plan  1. Ambulation, several times daily, encouraged.  2. Pain control-prns, multimodal approach.   3. IS-encourage  4. DVT proph- Mechanical, subcutaneous Lovenox starting postop day 1  5. Bowel regimen, alvimopan  6. Resume home medications as appropriate  7. Monitor post-op labs  8. DC planning   9. Diet, Clears, advance diet as tolerated.  IVF initially, monitor volume status.    -Dyslipidemia:  Resume home regimen statin .( formulary substitution when appropriate).     -GERD:  Resume PPI.  Formulary substitution when indicated.    Dragon disclaimer:  Part of this encounter note is an electronic transcription/translation of spoken language to printed text. The electronic translation of spoken language may permit erroneous, or at times, nonsensical words or phrases to be inadvertently transcribed; Although I have reviewed the note for such errors, some may still exist.    Collins Badillo MD  04/22/24  13:49 EDT

## 2024-04-22 NOTE — OP NOTE
[facility]  OPERATIVE REPORT      Merle Granados  4/22/2024    Pre-op Diagnosis:   Recurrent complicated diverticulitis      Post-op Diagnosis:    Post-Op Diagnosis Codes:  Same      Procedure(s):  ROBOTIC LOW ANTERIOR RESECTION WITH SPLENIC FLEXURE MOBILIZATION  PROSCTOSCOPY    Surgeon(s):  Tico Morley MD    Anesthesia: General with Block    Staff:   Circulator: Cammie Espinoza RN; Carly Cortes RN  Scrub Person: Ursula Cho  Assistant: Kathrin Markham PA  Other: Kera Reyes    Assistant: Kathrin Markham PA was responsible for performing the following activities: Retraction, Suction, Irrigation, Suturing, Closing, and Placing Dressing and their skilled assistance was necessary for the success of this case.     This procedure was not performed to treat colon cancer through resection       Estimated Blood Loss: 50 mL    Urine Voided: * No values recorded between 4/22/2024  7:29 AM and 4/22/2024 10:13 AM *    Specimens:                Specimens       ID Source Type Tests Collected By Collected At Frozen?    A Large Intestine, Sigmoid Colon Tissue TISSUE PATHOLOGY EXAM   Tico Morley MD 4/22/24 0915 No    Description: sigmoid approximal rectum, ring donut                  Drains:   Urethral Catheter Silicone 16 Fr. (Active)       Findings: Evidence of prior diverticular disease of the sigmoid colon right over the pelvic inlet.  Successful low anterior resection, splenic flexure mobilization with end-to-end colorectal anastomosis created at 12 cm with a negative leak test.    Complications: None    Procedure in Detail: After informed written consent was obtained, the patient was brought to the operating theater, timeout was performed all parties in agreement.  General anesthesia was introduced, antibiotics were administered, she underwent a preoperative tap block performed by anesthesia, Gan catheter was placed, she was placed in the lithotomy position  with all pressure points padded.  The abdomen was then prepped and draped in the usual sterile fashion.    I first started the procedure by obtaining pneumoperitoneum at Ventura's point.  This was performed atraumatically.  3 additional robotic trochars were then placed in a diagonal line from the left subcostal space to the right ASIS.  An additional assistant port was placed in the right hemiabdomen.  The patient was then placed in the Trendelenburg position with the right side down.  The robot was then docked and I resumed dissection at the bedside .    The sigmoid colon was then elevated and I incised where the mesentery met the retroperitoneum.  Retroperitoneal structures were then swept down, the ureter was identified and protected throughout this part.  This dissection was carried over the sacral promontory as well as cephalad back towards the MONSERRAT.  The MONSERRAT was not divided and kept as this was a nononcologic case.  I did divide the superior hemorrhoidal vessels though.  This was performed the vessel sealer.  I then performed a medial to lateral dissection.  The lateral sigmoid attachments were then released with electrocautery as well as the white line of Toldt.    The splenic flexure was released through combination of electrocautery and the vessel sealer.  Splenic colic vessels were divided as well as the greater omentum was lifted off of the distal transverse colon.  The associated transverse mesentery was released from the inferior edge of the pancreas.    I then turned my attention to the pelvis.  The lateral attachments of the mesorectum were scored with cautery.  I then resumed my dissection posteriorly immediately posterior to the mesorectal plane.  This dissection was performed with cautery until adequate length was achieved.  EEA sizers were then placed in the rectum to identify an appropriate distal margin, this was of the proximal rectum, after there was loss of epiploic fat, the tinea had  splayed and it was overlying the sacral promontory.  Mesorectum was divided with the vessel sealer and the bowel was divided with a single firing of a blue load 60 mm stapler.  Staple line was noted to be hemostatic.  I then identified an appropriate proximal margin which was the mid left colon.  The associated mesentery was divided with the vessel sealer.  ICG dye was then administered noting adequate vascular supply to both the proximal and distal margin.    At this point, the robot was undocked and pneumoperitoneum was aborted.  I created a 4 cm Pfannenstiel incision.  Wound protector was placed in the specimen was eviscerated from the abdomen.  The proximal margin was created with a Krystian clamp and a 10 blade.  Specimen was passed off to pathology.  2-0 Prolene was then used to create a pursestring follow-up placement of a 29 mm EEA anvil into the proximal margin.  Pneumoperitoneum was then reestablished.  I then created an end-to-end tension-free colorectal anastomosis.  Proctosigmoidoscopy was then performed noting intact anastomosis at 12 cm from the anal verge with no signs of bleeding and no signs of a leak while the anastomosis was submerged under saline in the pelvis.    Hemostasis was verified followed by closure of the right lower quadrant 12 mm port with a needle close device.  Pneumoperitoneum was then aborted.  Pfannenstiel incision peritoneum was closed with 0 Vicryl, fascia was approximated with #1 PDS.  All skin incisions were then closed with Monocryl.    Patient tolerated the procedure well was subsequently extubated and transferred to the recovery room.        Tico Morley MD     Date: 4/22/2024  Time: 10:23 EDT

## 2024-04-22 NOTE — H&P
Pre-Op H&P  Merle Granados  [id number]  1960      Chief complaint: Abd pain      Subjective:  Patient is a 63 y.o.female presents for scheduled surgery by Dr. Morley. She anticipates a ROBOTIC LOW ANTERIOR RESECTION  today. She has 2 year history of diverticulitis. She had LLQ pain. She had colonoscopy 2/14/24 that showed pancolonic diverticulitis. She had a hospitalization in January for diverticulitis with perforation and abscess. She was on IV abx. No recent fevers, chills or night sweats.      Review of Systems:  Constitutional-- No fever, chills or sweats. No fatigue.  CV-- No chest pain, palpitation or syncope. +HLD  Resp-- No SOB, cough, hemoptysis  Skin--No rashes or lesions      Allergies: No Known Allergies      Home Meds:  Medications Prior to Admission   Medication Sig Dispense Refill Last Dose    ALPRAZolam (XANAX) 0.5 MG tablet Take 1 tablet by mouth 2 (Two) Times a Day As Needed for Anxiety.   4/22/2024 at 0500    pantoprazole (PROTONIX) 40 MG EC tablet Take 1 tablet by mouth Daily.   4/21/2024    rosuvastatin (CRESTOR) 10 MG tablet Take 1 tablet by mouth Every Night.   4/21/2024    baclofen (LIORESAL) 10 MG tablet Take 1 tablet by mouth 2 (Two) Times a Day As Needed for Muscle Spasms.   More than a month    ibuprofen (ADVIL,MOTRIN) 600 MG tablet Take 1 tablet by mouth Every 8 (Eight) Hours As Needed for Mild Pain or Headache.   More than a month         PMH:   Past Medical History:   Diagnosis Date    Arthritis     dhruv knees    Diverticulitis     Hyperlipidemia     Skin cancer     left arm     PSH:    Past Surgical History:   Procedure Laterality Date    COLONOSCOPY      COLONOSCOPY N/A 10/17/2022    Procedure: COLONOSCOPY;  Surgeon: Neal Carrillo MD;  Location: [facility];  Service: Gastroenterology;  Laterality: N/A;    SKIN CANCER EXCISION      TUBAL ABDOMINAL LIGATION         Immunization History:  Influenza: UTD  Pneumococcal: No  Tetanus: No  Covid : x2    Social  "History:   Tobacco:   Social History     Tobacco Use   Smoking Status Never   Smokeless Tobacco Never      Alcohol:     Social History     Substance and Sexual Activity   Alcohol Use No         Physical Exam:/87 (BP Location: Right arm, Patient Position: Lying)   Pulse 93   Temp 97.4 °F (36.3 °C) (Temporal)   Resp 15   Ht 154.9 cm (61\")   Wt 59.4 kg (130 lb 15.3 oz)   SpO2 96%   BMI 24.74 kg/m²       General Appearance:    Alert, cooperative, no distress, appears stated age   Head:    Normocephalic, without obvious abnormality, atraumatic   Lungs:     Clear to auscultation bilaterally, respirations unlabored    Heart:   Regular rate and rhythm, S1 and S2 normal    Abdomen:    Soft without tenderness   Extremities:   Extremities normal, atraumatic, no cyanosis or edema   Skin:   Skin color, texture, turgor normal, no rashes or lesions   Neurologic:   Grossly intact     Results Review:     LABS:  Lab Results   Component Value Date    WBC 7.23 04/08/2024    HGB 13.7 04/08/2024    HCT 42.8 04/08/2024    MCV 89.9 04/08/2024     04/08/2024    NEUTROABS 9.88 (H) 07/03/2021    GLUCOSE 119 (H) 04/08/2024    BUN 14 04/08/2024    CREATININE 0.74 04/08/2024    EGFRIFNONA 93 07/03/2021     04/08/2024    K 3.4 (L) 04/08/2024     04/08/2024    CO2 29.0 04/08/2024    MG 1.9 07/02/2021    PHOS 3.0 07/02/2021    CALCIUM 9.7 04/08/2024    ALBUMIN 4.7 04/08/2024    AST 24 04/08/2024    ALT 20 04/08/2024    BILITOT 0.3 04/08/2024       RADIOLOGY:  Imaging Results (Last 72 Hours)       ** No results found for the last 72 hours. **            I reviewed the patient's new clinical results.    Cancer Staging (if applicable)  Cancer Patient: __ yes __no __unknown; If yes, clinical stage T:__ N:__M:__, stage group or __N/A      Impression: Diverticulitis with perforation and abscess       Plan: ROBOTIC LOW ANTERIOR RESECTION       Estela Prince, APRN   4/22/2024   07:02 EDT  "

## 2024-04-22 NOTE — ANESTHESIA PROCEDURE NOTES
Peripheral Block      Patient reassessed immediately prior to procedure    Patient location during procedure: OR  Reason for block: at surgeon's request and post-op pain management  Performed by  Anesthesiologist: Noelle Kwok DO  CRNA/CAA: Schirmer, Shelley P, CRNA  Preanesthetic Checklist  Completed: patient identified, IV checked, site marked, risks and benefits discussed, surgical consent, monitors and equipment checked, pre-op evaluation and timeout performed  Prep:  Pt Position: supine  Sterile barriers:cap, gloves, mask and washed/disinfected hands  Prep: ChloraPrep  Patient monitoring: blood pressure monitoring, continuous pulse oximetry and EKG  Procedure    Sedation: yes  Performed under: general  Guidance:ultrasound guided    ULTRASOUND INTERPRETATION.  Using ultrasound guidance a 20 G gauge needle was placed in close proximity to the nerve, at which point, under ultrasound guidance anesthetic was injected in the area of the nerve and spread of the anesthesia was seen on ultrasound in close proximity thereto.  There were no abnormalities seen on ultrasound; a digital image was taken; and the patient tolerated the procedure with no complications. Images:still images obtained, printed/placed on chart    Laterality:Bilateral  Block Type:TAP  Injection Technique:single-shot  Needle Type:short-bevel and echogenic  Needle Gauge:20 G  Resistance on Injection: none    Medications Used: dexamethasone sodium phosphate injection - Injection   4 mg - 4/22/2024 7:29:00 AM  bupivacaine PF (MARCAINE) 0.25 % injection - Injection   60 mL - 4/22/2024 7:29:00 AM      Medications  Comment:Block Injection:  LA dose divided between Right and Left block        Post Assessment  Injection Assessment: negative aspiration for heme, incremental injection and no paresthesia on injection  Patient Tolerance:comfortable throughout block  Complications:no  Additional Notes  TAP x 2 midaxillary

## 2024-04-22 NOTE — CASE MANAGEMENT/SOCIAL WORK
Discharge Planning Assessment  [facility]     Patient Name: Merle Granados  MRN: [id number]  Today's Date: 4/22/2024    Admit Date: 4/22/2024    Plan: home   Discharge Needs Assessment       Row Name 04/22/24 1457       Living Environment    People in Home spouse    Current Living Arrangements home    Potentially Unsafe Housing Conditions none    In the past 12 months has the electric, gas, oil, or water company threatened to shut off services in your home? No    Primary Care Provided by self    Provides Primary Care For no one    Family Caregiver if Needed spouse    Quality of Family Relationships supportive;helpful    Able to Return to Prior Arrangements yes       Resource/Environmental Concerns    Resource/Environmental Concerns none    Transportation Concerns none       Transportation Needs    In the past 12 months, has lack of transportation kept you from medical appointments or from getting medications? no    In the past 12 months, has lack of transportation kept you from meetings, work, or from getting things needed for daily living? No       Food Insecurity    Within the past 12 months, you worried that your food would run out before you got the money to buy more. Never true    Within the past 12 months, the food you bought just didn't last and you didn't have money to get more. Never true       Transition Planning    Patient/Family Anticipates Transition to home    Patient/Family Anticipated Services at Transition none    Transportation Anticipated family or friend will provide       Discharge Needs Assessment    Readmission Within the Last 30 Days no previous admission in last 30 days    Equipment Currently Used at Home bp cuff;glucometer    Concerns to be Addressed no discharge needs identified    Anticipated Changes Related to Illness none                   Discharge Plan       Row Name 04/22/24 1458       Plan    Plan home    Patient/Family in Agreement with Plan yes    Plan Comments Met with patient  at the bedside to initiate discharge planning. Patient lives with her  in [facility]. She is independent with ADLs and works out on her bow-flex everyday. She has DME including a glucometer and BP cuff. Patient has [employer] [place] insurance with prescription coverage, and uses the [facility] in [place] to fill scripts. Patient denies any HH or home oxygen. Patients goal at discharge is to return home. CM will continue to follow.    Final Discharge Disposition Code 01 - home or self-care                  Continued Care and Services - Admitted Since 4/22/2024    No active coordination exists for this encounter.          Demographic Summary       Row Name 04/22/24 1456       General Information    Admission Type inpatient    Arrived From home    Referral Source physician    Reason for Consult discharge planning    Preferred Language English    General Information Comments PCP Jakub Triana       Contact Information    Permission Granted to Share Info With family/designee    Contact Information Comments Spencer Granados (Spouse) [phone]                   Functional Status       Row Name 04/22/24 1457       Functional Status    Usual Activity Tolerance excellent    Current Activity Tolerance good       Functional Status, IADL    Medications independent    Meal Preparation independent    Housekeeping independent    Laundry independent    Shopping independent       Mental Status    General Appearance WDL WDL       Mental Status Summary    Recent Changes in Mental Status/Cognitive Functioning no changes       Employment/    Employment Status retired                   Psychosocial    No documentation.                  Abuse/Neglect    No documentation.                  Legal    No documentation.                  Substance Abuse    No documentation.                  Patient Forms    No documentation.                     Aliza Castro RN

## 2024-04-22 NOTE — ANESTHESIA POSTPROCEDURE EVALUATION
Patient: Merle Granados    Procedure Summary       Date: 04/22/24 [facility] / [facility]:  [facility]    Anesthesia Start: 0729 Anesthesia Stop: 1019    Procedure: ROBOTIC LOW ANTERIOR RESECTION WITH SPLENIC FLEXURE MOBILIZATION (Abdomen) Diagnosis:     Surgeons: Tico Morley MD Provider: Noelle Kwok DO    Anesthesia Type: general with block ASA Status: 2            Anesthesia Type: general with block    Vitals  Vitals Value Taken Time   /100 04/22/24 1015   Temp     Pulse 72 04/22/24 1020   Resp     SpO2 98 % 04/22/24 1020   Vitals shown include unfiled device data.        Post Anesthesia Care and Evaluation    Patient location during evaluation: PACU  Patient participation: complete - patient participated  Level of consciousness: awake and alert  Pain management: adequate    Airway patency: patent  Anesthetic complications: No anesthetic complications  PONV Status: none  Cardiovascular status: hemodynamically stable and acceptable  Respiratory status: nonlabored ventilation, acceptable and nasal cannula  Hydration status: acceptable

## 2024-04-22 NOTE — PLAN OF CARE
Problem: Adult Inpatient Plan of Care  Goal: Absence of Hospital-Acquired Illness or Injury  Intervention: Identify and Manage Fall Risk  Description: Perform standard risk assessment on admission using a validated tool or comprehensive approach appropriate to the patient; reassess fall risk frequently, with change in status or transfer to another level of care.  Communicate fall injury risk to interprofessional healthcare team.  Determine need for increased observation, equipment and environmental modification, such as low bed, signage and supportive, nonskid footwear.  Adjust safety measures to individual developmental age, stage and identified risk factors.  Reinforce the importance of safety and physical activity with patient and family.  Perform regular intentional rounding to assess need for position change, pain assessment and personal needs, including assistance with toileting.  Recent Flowsheet Documentation  Taken 4/22/2024 1504 by Luba Caicedo RN  Safety Promotion/Fall Prevention:   activity supervised   nonskid shoes/slippers when out of bed  Goal: Optimal Comfort and Wellbeing  Intervention: Monitor Pain and Promote Comfort  Description: Assess pain level, treatment efficacy and patient response at regular intervals using a consistent pain scale.  Consider the presence and impact of preexisting chronic pain.  Encourage patient and caregiver involvement in pain assessment, interventions and safety measures.  Recent Flowsheet Documentation  Taken 4/22/2024 1804 by Luba Caicedo RN  Pain Management Interventions: care clustered     Problem: Pain Acute  Goal: Acceptable Pain Control and Functional Ability  Outcome: Ongoing, Progressing  Intervention: Prevent or Manage Pain  Description: Evaluate pain level, effect of treatment and patient response at regular intervals.  Minimize painful stimuli; coordinate care and adjust environment (e.g., light, noise, unnecessary movement); promote  sleep/rest.  Match pharmacologic analgesia to severity and type of pain mechanism (e.g., neuropathic, muscle, inflammatory); consider multimodal approach (e.g., nonopioid, opioid, adjuvant).  Provide medication at regular intervals; titrate to patient response; premedicate for painful procedures.  Manage breakthrough pain with additional doses; consider rotation or switching medication.  Monitor for signs of substance tolerance (increased dose to reach desired effect, decreased effect with same dose).  Manage medication-induced effects, such as constipation, nausea, pruritus, urinary retention, somnolence and dizziness.  Provide multimodal interventions, such as as physical activity, therapeutic exercise, yoga, TENS (transcutaneous electrical nerve stimulation) and manual therapy.  Train in functional activity modifications, such as body mechanics, posture, ergonomics, energy conservation and activity pacing.  Consider addition of complementary or alternative therapy, such as acupuncture, hypnosis or therapeutic touch.  Flowsheets (Taken 4/22/2024 5396)  Sensory Stimulation Regulation: quiet environment promoted  Bowel Elimination Promotion: adequate fluid intake promoted  Sleep/Rest Enhancement: awakenings minimized  Medication Review/Management: medications reviewed  Intervention: Develop Pain Management Plan  Description: Acknowledge patient as the expert in pain self-management.  Use a consistent, validated tool for pain assessment; include function and quality of life.  Evaluate risk for opioid use and dependence.  Set pain management goals; determine acceptable level of discomfort to allow for maximal functioning.  Determine [id number] pain management plan, including both pharmacologic and nonpharmacologic measures; integrate management of chronic (persistent) pain.  Identify and integrate past successful treatment measures, if able.  Encourage patient and caregiver involvement in pain assessment,  interventions and safety measures.  Re-evaluate plan regularly.  Flowsheets (Taken 4/22/2024 1804)  Pain Management Interventions: care clustered   Goal Outcome Evaluation:

## 2024-04-23 LAB
ANION GAP SERPL CALCULATED.3IONS-SCNC: 12 MMOL/L (ref 5–15)
BASOPHILS # BLD AUTO: 0.01 10*3/MM3 (ref 0–0.2)
BASOPHILS NFR BLD AUTO: 0.1 % (ref 0–1.5)
BUN SERPL-MCNC: 12 MG/DL (ref 8–23)
BUN/CREAT SERPL: 16.2 (ref 7–25)
CALCIUM SPEC-SCNC: 8.2 MG/DL (ref 8.6–10.5)
CHLORIDE SERPL-SCNC: 100 MMOL/L (ref 98–107)
CO2 SERPL-SCNC: 23 MMOL/L (ref 22–29)
CREAT SERPL-MCNC: 0.74 MG/DL (ref 0.57–1)
CYTO UR: NORMAL
DEPRECATED RDW RBC AUTO: 46.3 FL (ref 37–54)
EGFRCR SERPLBLD CKD-EPI 2021: 90.5 ML/MIN/1.73
EOSINOPHIL # BLD AUTO: 0 10*3/MM3 (ref 0–0.4)
EOSINOPHIL NFR BLD AUTO: 0 % (ref 0.3–6.2)
ERYTHROCYTE [DISTWIDTH] IN BLOOD BY AUTOMATED COUNT: 14.1 % (ref 12.3–15.4)
GLUCOSE BLDC GLUCOMTR-MCNC: 107 MG/DL (ref 70–130)
GLUCOSE BLDC GLUCOMTR-MCNC: 108 MG/DL (ref 70–130)
GLUCOSE BLDC GLUCOMTR-MCNC: 116 MG/DL (ref 70–130)
GLUCOSE BLDC GLUCOMTR-MCNC: 123 MG/DL (ref 70–130)
GLUCOSE SERPL-MCNC: 140 MG/DL (ref 65–99)
HCT VFR BLD AUTO: 37.2 % (ref 34–46.6)
HGB BLD-MCNC: 12.1 G/DL (ref 12–15.9)
IMM GRANULOCYTES # BLD AUTO: 0.08 10*3/MM3 (ref 0–0.05)
IMM GRANULOCYTES NFR BLD AUTO: 0.6 % (ref 0–0.5)
LAB AP CASE REPORT: NORMAL
LAB AP CLINICAL INFORMATION: NORMAL
LYMPHOCYTES # BLD AUTO: 1.11 10*3/MM3 (ref 0.7–3.1)
LYMPHOCYTES NFR BLD AUTO: 8.3 % (ref 19.6–45.3)
MAGNESIUM SERPL-MCNC: 1.7 MG/DL (ref 1.6–2.4)
MCH RBC QN AUTO: 29.1 PG (ref 26.6–33)
MCHC RBC AUTO-ENTMCNC: 32.5 G/DL (ref 31.5–35.7)
MCV RBC AUTO: 89.4 FL (ref 79–97)
MONOCYTES # BLD AUTO: 1.03 10*3/MM3 (ref 0.1–0.9)
MONOCYTES NFR BLD AUTO: 7.7 % (ref 5–12)
NEUTROPHILS NFR BLD AUTO: 11.16 10*3/MM3 (ref 1.7–7)
NEUTROPHILS NFR BLD AUTO: 83.3 % (ref 42.7–76)
NRBC BLD AUTO-RTO: 0 /100 WBC (ref 0–0.2)
PATH REPORT.FINAL DX SPEC: NORMAL
PATH REPORT.GROSS SPEC: NORMAL
PLATELET # BLD AUTO: 181 10*3/MM3 (ref 140–450)
PMV BLD AUTO: 12.2 FL (ref 6–12)
POTASSIUM SERPL-SCNC: 3.6 MMOL/L (ref 3.5–5.2)
RBC # BLD AUTO: 4.16 10*6/MM3 (ref 3.77–5.28)
SODIUM SERPL-SCNC: 135 MMOL/L (ref 136–145)
WBC NRBC COR # BLD AUTO: 13.39 10*3/MM3 (ref 3.4–10.8)

## 2024-04-23 PROCEDURE — 25010000002 ENOXAPARIN PER 10 MG: Performed by: STUDENT IN AN ORGANIZED HEALTH CARE EDUCATION/TRAINING PROGRAM

## 2024-04-23 PROCEDURE — 80048 BASIC METABOLIC PNL TOTAL CA: CPT | Performed by: STUDENT IN AN ORGANIZED HEALTH CARE EDUCATION/TRAINING PROGRAM

## 2024-04-23 PROCEDURE — 83735 ASSAY OF MAGNESIUM: CPT | Performed by: STUDENT IN AN ORGANIZED HEALTH CARE EDUCATION/TRAINING PROGRAM

## 2024-04-23 PROCEDURE — 85025 COMPLETE CBC W/AUTO DIFF WBC: CPT | Performed by: STUDENT IN AN ORGANIZED HEALTH CARE EDUCATION/TRAINING PROGRAM

## 2024-04-23 PROCEDURE — 25010000002 MORPHINE PER 10 MG: Performed by: STUDENT IN AN ORGANIZED HEALTH CARE EDUCATION/TRAINING PROGRAM

## 2024-04-23 PROCEDURE — 82948 REAGENT STRIP/BLOOD GLUCOSE: CPT

## 2024-04-23 RX ADMIN — METHOCARBAMOL 750 MG: 750 TABLET ORAL at 21:08

## 2024-04-23 RX ADMIN — ACETAMINOPHEN 1000 MG: 500 TABLET ORAL at 08:33

## 2024-04-23 RX ADMIN — ACETAMINOPHEN 1000 MG: 500 TABLET ORAL at 21:08

## 2024-04-23 RX ADMIN — ALVIMOPAN 12 MG: 12 CAPSULE ORAL at 08:33

## 2024-04-23 RX ADMIN — ACETAMINOPHEN 1000 MG: 500 TABLET ORAL at 14:59

## 2024-04-23 RX ADMIN — MORPHINE SULFATE 2 MG: 2 INJECTION, SOLUTION INTRAMUSCULAR; INTRAVENOUS at 21:09

## 2024-04-23 RX ADMIN — BACLOFEN 10 MG: 10 TABLET ORAL at 08:33

## 2024-04-23 RX ADMIN — OXYCODONE 5 MG: 5 TABLET ORAL at 08:32

## 2024-04-23 RX ADMIN — PANTOPRAZOLE SODIUM 40 MG: 40 TABLET, DELAYED RELEASE ORAL at 08:33

## 2024-04-23 RX ADMIN — METHOCARBAMOL 750 MG: 750 TABLET ORAL at 05:33

## 2024-04-23 RX ADMIN — ACETAMINOPHEN 1000 MG: 500 TABLET ORAL at 03:32

## 2024-04-23 RX ADMIN — ROSUVASTATIN 10 MG: 10 TABLET, FILM COATED ORAL at 21:08

## 2024-04-23 RX ADMIN — BACLOFEN 10 MG: 10 TABLET ORAL at 21:08

## 2024-04-23 RX ADMIN — BACLOFEN 10 MG: 10 TABLET ORAL at 17:01

## 2024-04-23 RX ADMIN — METHOCARBAMOL 750 MG: 750 TABLET ORAL at 14:59

## 2024-04-23 RX ADMIN — ENOXAPARIN SODIUM 40 MG: 100 INJECTION SUBCUTANEOUS at 08:33

## 2024-04-23 RX ADMIN — ALVIMOPAN 12 MG: 12 CAPSULE ORAL at 21:08

## 2024-04-23 NOTE — PLAN OF CARE
Problem: Adult Inpatient Plan of Care  Goal: Plan of Care Review  Outcome: Ongoing, Progressing  Flowsheets (Taken 4/23/2024 1549)  Progress: improving  Plan of Care Reviewed With: patient  Goal: Patient-Specific Goal (Individualized)  Outcome: Ongoing, Progressing  Goal: Absence of Hospital-Acquired Illness or Injury  Outcome: Ongoing, Progressing  Intervention: Identify and Manage Fall Risk  Recent Flowsheet Documentation  Taken 4/23/2024 1400 by Kartik Stafford RN  Safety Promotion/Fall Prevention:   activity supervised   nonskid shoes/slippers when out of bed   room organization consistent   safety round/check completed  Taken 4/23/2024 1200 by Kartik Stafford RN  Safety Promotion/Fall Prevention:   activity supervised   nonskid shoes/slippers when out of bed   room organization consistent   safety round/check completed  Taken 4/23/2024 1000 by Kartik Stafford RN  Safety Promotion/Fall Prevention:   activity supervised   nonskid shoes/slippers when out of bed   room organization consistent   safety round/check completed  Taken 4/23/2024 0834 by Kartik Stafford RN  Safety Promotion/Fall Prevention:   activity supervised   nonskid shoes/slippers when out of bed   room organization consistent   safety round/check completed  Intervention: Prevent Skin Injury  Recent Flowsheet Documentation  Taken 4/23/2024 1400 by Kartik Stafford RN  Body Position: position changed independently  Taken 4/23/2024 1200 by Kartik Stafford RN  Body Position:   position changed independently   weight shifting  Skin Protection:   adhesive use limited   tubing/devices free from skin contact  Taken 4/23/2024 1000 by Kartik Stafford RN  Body Position:   position changed independently   weight shifting  Skin Protection:   adhesive use limited   tubing/devices free from skin contact  Taken 4/23/2024 0834 by Kartik Stafford RN  Body Position:   position changed independently   weight shifting  Skin Protection:   adhesive use limited    tubing/devices free from skin contact  Intervention: Prevent and Manage VTE (Venous Thromboembolism) Risk  Recent Flowsheet Documentation  Taken 4/23/2024 1546 by Kartik Stafford RN  Activity Management: ambulated outside room  Taken 4/23/2024 1500 by Kartik Stafford RN  Activity Management: ambulated outside room  Taken 4/23/2024 1400 by Kartik Stafford RN  Activity Management: activity encouraged  Taken 4/23/2024 1200 by Kartik Stafford RN  Activity Management: activity encouraged  Taken 4/23/2024 1000 by Kartik Stafford RN  Activity Management: ambulated outside room  Taken 4/23/2024 0834 by Kartik Stafford RN  Activity Management: activity encouraged  VTE Prevention/Management: (patient is on Lovenox sub q) other (see comments)  Range of Motion: active ROM (range of motion) encouraged  Taken 4/23/2024 0832 by Kartik Stafford RN  Activity Management: ambulated outside room  Taken 4/23/2024 0800 by Kartik Stafford RN  Activity Management: ambulated in room  Intervention: Prevent Infection  Recent Flowsheet Documentation  Taken 4/23/2024 1400 by Kartik Stafford RN  Infection Prevention:   environmental surveillance performed   equipment surfaces disinfected   hand hygiene promoted   personal protective equipment utilized   rest/sleep promoted   single patient room provided  Taken 4/23/2024 1200 by Kartik Stafford RN  Infection Prevention:   environmental surveillance performed   equipment surfaces disinfected   hand hygiene promoted   personal protective equipment utilized   rest/sleep promoted   single patient room provided  Taken 4/23/2024 1000 by Kartik Stafford RN  Infection Prevention:   environmental surveillance performed   equipment surfaces disinfected   hand hygiene promoted   personal protective equipment utilized   rest/sleep promoted   single patient room provided  Taken 4/23/2024 0834 by Kartik Stafford RN  Infection Prevention:   environmental surveillance performed   equipment surfaces disinfected    hand hygiene promoted   personal protective equipment utilized   rest/sleep promoted   single patient room provided  Goal: Optimal Comfort and Wellbeing  Outcome: Ongoing, Progressing  Intervention: Monitor Pain and Promote Comfort  Recent Flowsheet Documentation  Taken 4/23/2024 0834 by Kartik Stafford RN  Pain Management Interventions: see MAR  Intervention: Provide Person-Centered Care  Recent Flowsheet Documentation  Taken 4/23/2024 0834 by Kartik Stafford RN  Trust Relationship/Rapport:   care explained   choices provided   emotional support provided   empathic listening provided   questions answered   questions encouraged   reassurance provided   thoughts/feelings acknowledged  Goal: Readiness for Transition of Care  Outcome: Ongoing, Progressing     Problem: Pain Acute  Goal: Acceptable Pain Control and Functional Ability  Outcome: Ongoing, Progressing  Intervention: Prevent or Manage Pain  Recent Flowsheet Documentation  Taken 4/23/2024 1400 by Kartik Stafford RN  Sensory Stimulation Regulation:   auditory stimulation minimized   care clustered   lighting decreased   quiet environment promoted   tactile stimulation minimized   visual stimulation minimized  Sleep/Rest Enhancement:   awakenings minimized   relaxation techniques promoted  Medication Review/Management: medications reviewed  Taken 4/23/2024 1200 by Kartik Stafford RN  Sensory Stimulation Regulation:   auditory stimulation minimized   care clustered   quiet environment promoted   tactile stimulation minimized   visual stimulation minimized  Sleep/Rest Enhancement:   awakenings minimized   relaxation techniques promoted  Medication Review/Management: medications reviewed  Taken 4/23/2024 1000 by Kartik Stafford RN  Sensory Stimulation Regulation:   auditory stimulation minimized   lighting decreased   care clustered   quiet environment promoted   tactile stimulation minimized   visual stimulation minimized  Sleep/Rest Enhancement:   awakenings  minimized   regular sleep/rest pattern promoted   relaxation techniques promoted  Medication Review/Management: medications reviewed  Taken 4/23/2024 0834 by Kartik Stafford RN  Sensory Stimulation Regulation:   auditory stimulation minimized   care clustered   quiet environment promoted   tactile stimulation minimized   visual stimulation minimized  Sleep/Rest Enhancement:   awakenings minimized   regular sleep/rest pattern promoted  Medication Review/Management: medications reviewed  Intervention: Develop Pain Management Plan  Recent Flowsheet Documentation  Taken 4/23/2024 0834 by Kartik Stafford RN  Pain Management Interventions: see MAR  Intervention: Optimize Psychosocial Wellbeing  Recent Flowsheet Documentation  Taken 4/23/2024 1400 by Kartik Stafford RN  Supportive Measures:   verbalization of feelings encouraged   self-responsibility promoted  Spiritual Activities Assistance:   affirmation provided   personal rituals encouraged  Taken 4/23/2024 1200 by Kartik Stafford RN  Supportive Measures:   self-responsibility promoted   verbalization of feelings encouraged  Spiritual Activities Assistance:   affirmation provided   personal rituals encouraged  Taken 4/23/2024 1000 by Kartik Stafford RN  Supportive Measures: self-responsibility promoted  Spiritual Activities Assistance: affirmation provided  Taken 4/23/2024 0834 by Kartik Stafford RN  Supportive Measures:   self-reflection promoted   self-responsibility promoted  Diversional Activities: television  Spiritual Activities Assistance:   affirmation provided   personal rituals encouraged     Problem: Fall Injury Risk  Goal: Absence of Fall and Fall-Related Injury  Outcome: Ongoing, Progressing  Intervention: Identify and Manage Contributors  Recent Flowsheet Documentation  Taken 4/23/2024 1400 by Kartik Stafford RN  Medication Review/Management: medications reviewed  Self-Care Promotion:   BADL personal objects within reach   BADL personal routines  maintained   safe use of adaptive equipment encouraged  Taken 4/23/2024 1200 by Kartik Stafford RN  Medication Review/Management: medications reviewed  Self-Care Promotion:   BADL personal objects within reach   BADL personal routines maintained   safe use of adaptive equipment encouraged  Taken 4/23/2024 1000 by Kartik Stafford RN  Medication Review/Management: medications reviewed  Self-Care Promotion:   BADL personal objects within reach   BADL personal routines maintained   safe use of adaptive equipment encouraged  Taken 4/23/2024 0834 by Kartik Stafford RN  Medication Review/Management: medications reviewed  Self-Care Promotion:   BADL personal objects within reach   BADL personal routines maintained   safe use of adaptive equipment encouraged  Intervention: Promote Injury-Free Environment  Recent Flowsheet Documentation  Taken 4/23/2024 1400 by Kartik Stafford RN  Safety Promotion/Fall Prevention:   activity supervised   nonskid shoes/slippers when out of bed   room organization consistent   safety round/check completed  Taken 4/23/2024 1200 by Kartik Stafford RN  Safety Promotion/Fall Prevention:   activity supervised   nonskid shoes/slippers when out of bed   room organization consistent   safety round/check completed  Taken 4/23/2024 1000 by Kartik Stafford RN  Safety Promotion/Fall Prevention:   activity supervised   nonskid shoes/slippers when out of bed   room organization consistent   safety round/check completed  Taken 4/23/2024 0834 by Kartik Stafford RN  Safety Promotion/Fall Prevention:   activity supervised   nonskid shoes/slippers when out of bed   room organization consistent   safety round/check completed     Problem: Infection  Goal: Absence of Infection Signs and Symptoms  Outcome: Ongoing, Progressing   Goal Outcome Evaluation:  Plan of Care Reviewed With: patient        Progress: improving

## 2024-04-23 NOTE — PLAN OF CARE
Goal Outcome Evaluation:    A/O x4, 2L NC, non-tele. SBA- ambulated in [place]. PRN pain medication given for abd pain. No c/o nausea. FC d/c'd this AM.     Plan of Care Reviewed With: patient        Progress: improving       Problem: Adult Inpatient Plan of Care  Goal: Plan of Care Review  Outcome: Ongoing, Progressing  Flowsheets (Taken 4/23/2024 0428)  Progress: improving  Plan of Care Reviewed With: patient  Goal: Patient-Specific Goal (Individualized)  Outcome: Ongoing, Progressing  Goal: Absence of Hospital-Acquired Illness or Injury  Outcome: Ongoing, Progressing  Intervention: Identify and Manage Fall Risk  Recent Flowsheet Documentation  Taken 4/23/2024 0426 by Sofía Benton, RN  Safety Promotion/Fall Prevention:   activity supervised   assistive device/personal items within reach   clutter free environment maintained   nonskid shoes/slippers when out of bed   room organization consistent   safety round/check completed  Taken 4/23/2024 0315 by Sofía Benton, RN  Safety Promotion/Fall Prevention:   activity supervised   assistive device/personal items within reach   clutter free environment maintained   nonskid shoes/slippers when out of bed   room organization consistent   safety round/check completed  Taken 4/23/2024 0035 by Sofía Benton, RN  Safety Promotion/Fall Prevention:   activity supervised   assistive device/personal items within reach   clutter free environment maintained   nonskid shoes/slippers when out of bed   safety round/check completed   room organization consistent  Taken 4/22/2024 2205 by Sofía Benton, RN  Safety Promotion/Fall Prevention:   activity supervised   assistive device/personal items within reach   clutter free environment maintained   nonskid shoes/slippers when out of bed   room organization consistent   safety round/check completed  Taken 4/22/2024 2038 by Sofía Benton, RN  Safety Promotion/Fall Prevention:   activity supervised   assistive  device/personal items within reach   clutter free environment maintained   nonskid shoes/slippers when out of bed   room organization consistent   safety round/check completed  Intervention: Prevent Skin Injury  Recent Flowsheet Documentation  Taken 4/23/2024 0426 by Sofía Benton RN  Body Position: position changed independently  Skin Protection:   adhesive use limited   tubing/devices free from skin contact   transparent dressing maintained  Taken 4/23/2024 0315 by Sofía Benton RN  Body Position:   position changed independently   supine  Skin Protection:   adhesive use limited   incontinence pads utilized   transparent dressing maintained   tubing/devices free from skin contact  Taken 4/23/2024 0035 by Sofía Benton RN  Body Position:   position changed independently   weight shifting  Skin Protection:   adhesive use limited   transparent dressing maintained   tubing/devices free from skin contact  Taken 4/22/2024 2205 by Sofía Benton RN  Body Position:   position changed independently   weight shifting  Skin Protection:   adhesive use limited   transparent dressing maintained   tubing/devices free from skin contact  Taken 4/22/2024 2038 by Sofía Benton RN  Body Position: position changed independently  Skin Protection:   adhesive use limited   transparent dressing maintained   tubing/devices free from skin contact  Intervention: Prevent and Manage VTE (Venous Thromboembolism) Risk  Recent Flowsheet Documentation  Taken 4/23/2024 0426 by Sofía Benton, RN  Activity Management: activity encouraged  Taken 4/23/2024 0315 by Sofía Benton RN  Activity Management: activity encouraged  Taken 4/23/2024 0035 by Sofía Benton RN  Activity Management: activity encouraged  Taken 4/22/2024 2205 by Sofía Benton, RN  Activity Management: activity encouraged  Taken 4/22/2024 2038 by Sofía Benton RN  Activity Management: activity encouraged  VTE  Prevention/Management:   sequential compression devices on   bilateral  Range of Motion: active ROM (range of motion) encouraged  Intervention: Prevent Infection  Recent Flowsheet Documentation  Taken 4/23/2024 0426 by Sofía Benton RN  Infection Prevention: environmental surveillance performed  Taken 4/23/2024 0315 by Sofía Benton RN  Infection Prevention: environmental surveillance performed  Taken 4/23/2024 0035 by Sofía Benton RN  Infection Prevention: environmental surveillance performed  Taken 4/22/2024 2205 by Sofía Benton RN  Infection Prevention: environmental surveillance performed  Taken 4/22/2024 2038 by Sofía Benton RN  Infection Prevention:   cohorting utilized   environmental surveillance performed   equipment surfaces disinfected   hand hygiene promoted   rest/sleep promoted  Goal: Optimal Comfort and Wellbeing  Outcome: Ongoing, Progressing  Intervention: Monitor Pain and Promote Comfort  Recent Flowsheet Documentation  Taken 4/22/2024 2038 by Sofía Benton RN  Pain Management Interventions: see MAR  Intervention: Provide Person-Centered Care  Recent Flowsheet Documentation  Taken 4/22/2024 2038 by Sofía Benton RN  Trust Relationship/Rapport:   care explained   choices provided   questions answered   thoughts/feelings acknowledged  Goal: Readiness for Transition of Care  Outcome: Ongoing, Progressing     Problem: Pain Acute  Goal: Acceptable Pain Control and Functional Ability  Outcome: Ongoing, Progressing  Intervention: Prevent or Manage Pain  Recent Flowsheet Documentation  Taken 4/23/2024 0426 by Sofía Benton RN  Medication Review/Management: medications reviewed  Taken 4/23/2024 0315 by Sofía Benton RN  Medication Review/Management: medications reviewed  Taken 4/23/2024 0035 by Sofía Benton RN  Medication Review/Management: medications reviewed  Taken 4/22/2024 2205 by Sofía Benton RN  Medication Review/Management:  medications reviewed  Taken 4/22/2024 2038 by Sofía Benton, RN  Bowel Elimination Promotion:   adequate fluid intake promoted   ambulation promoted   chewing gum  Sleep/Rest Enhancement:   awakenings minimized   noise level reduced   relaxation techniques promoted  Medication Review/Management: medications reviewed  Intervention: Develop Pain Management Plan  Recent Flowsheet Documentation  Taken 4/22/2024 2038 by Sofía Benton, RN  Pain Management Interventions: see MAR  Intervention: Optimize Psychosocial Wellbeing  Recent Flowsheet Documentation  Taken 4/22/2024 2038 by Sofía Benton, RN  Supportive Measures: active listening utilized  Diversional Activities: television

## 2024-04-23 NOTE — PROGRESS NOTES
"IM progress note      Merle Granados  [id number]  1960     LOS: 1 day     Attending: Tico Morley MD    Primary Care Provider: Jakub Triana APRN      Chief Complaint/Reason for visit:  No chief complaint on file.      Subjective    Feels well. Good pain control. Ambulated, voided. Had BMs and tolerated soft diet advance.     Objective      Visit Vitals  /86 (BP Location: Right arm, Patient Position: Lying)   Pulse 70   Temp 99 °F (37.2 °C) (Oral)   Resp 18   Ht 154.9 cm (61\")   Wt 59.4 kg (130 lb 15.3 oz)   SpO2 94%   BMI 24.74 kg/m²     Temp (24hrs), Av.9 °F (37.2 °C), Min:98.4 °F (36.9 °C), Max:99.3 °F (37.4 °C)      Intake/Output:    Intake/Output Summary (Last 24 hours) at 2024 1542  Last data filed at 2024 0949  Gross per 24 hour   Intake 360 ml   Output 1500 ml   Net -1140 ml          Physical Exam:     General Appearance:    Alert, cooperative, in no acute distress   Head:    Normocephalic, without obvious abnormality, atraumatic    Lungs:     Normal effort, symmetric chest rise,  clear to  auscultation bilaterally                 Heart:    Regular rhythm and normal rate, normal S1 and S2   Abdomen:     Soft, benign. Incisions CDI.   Extremities:   No clubbing, cyanosis or edema.  No deformities.    Pulses:   Pulses palpable and equal bilaterally   Skin:   No bleeding, bruising or rash          Results Review:     I reviewed the patient's new clinical results.   Results from last 7 days   Lab Units 24  0323   WBC 10*3/mm3 13.39*   HEMOGLOBIN g/dL 12.1   HEMATOCRIT % 37.2   PLATELETS 10*3/mm3 181     Results from last 7 days   Lab Units 24  0323 24  0644   SODIUM mmol/L 135*  --    POTASSIUM mmol/L 3.6 3.5   CHLORIDE mmol/L 100  --    CO2 mmol/L 23.0  --    BUN mg/dL 12  --    CREATININE mg/dL 0.74  --    CALCIUM mg/dL 8.2*  --    GLUCOSE mg/dL 140*  --      I reviewed the patient's new imaging including images and reports.    All medications reviewed. "   acetaminophen, 1,000 mg, Oral, Q6H  alvimopan, 12 mg, Oral, BID  baclofen, 10 mg, Oral, TID  enoxaparin, 40 mg, Subcutaneous, Daily  methocarbamol, 750 mg, Oral, Q8H  pantoprazole, 40 mg, Oral, Daily  rosuvastatin, 10 mg, Oral, Nightly          Assessment & Plan       S/P colectomy ( Robotic LAR with SFM)    Diverticular disease of colon    Hyperlipidemia       Plan  1. Ambulation, encouraged.   2. Pain control-prns   3. IS-encouraged  4. DVT proph-Mech, P.Lov.  5. Bowel regimen  6. Diet, soft as tolerated.   7. Monitor post-op labs  8. DC planning. Likely home tomorrow.     -Dyslipidemia:  Resumed home regimen statin .( formulary substitution when appropriate).      -GERD:  Resumed PPI.  Formulary substitution when indicated.    Dragon disclaimer:  Part of this encounter note is an electronic transcription/translation of spoken language to printed text. The electronic translation of spoken language may permit erroneous, or at times, nonsensical words or phrases to be inadvertently transcribed; Although I have reviewed the note for such errors, some may still exist.    Collins Badillo MD  04/23/24  15:42 EDT

## 2024-04-23 NOTE — PROGRESS NOTES
"[facility]    1 Day Post-Op   Length of stay: 1 days    Subjective: Patient is doing well today.  She denies any fevers or chills.  Her pain is fairly well-controlled.  She has been able to void after catheter is removed.  Denies any flatus.    Physical Exam:  Blood pressure 118/73, pulse 72, temperature 98.4 °F (36.9 °C), temperature source Oral, resp. rate 20, height 154.9 cm (61\"), weight 59.4 kg (130 lb 15.3 oz), SpO2 96%.    Abd:Abdomen is soft, appropriately tender, nondistended.  Incisions are clean dry and intact    Labs past 24 hours:  Lab Results (last 24 hours)       Procedure Component Value Units Date/Time    Basic Metabolic Panel [[id number]]  (Abnormal) Collected: 04/23/24 0323    Specimen: Blood Updated: 04/23/24 0549     Glucose 140 mg/dL      BUN 12 mg/dL      Creatinine 0.74 mg/dL      Sodium 135 mmol/L      Potassium 3.6 mmol/L      Chloride 100 mmol/L      CO2 23.0 mmol/L      Calcium 8.2 mg/dL      BUN/Creatinine Ratio 16.2     Anion Gap 12.0 mmol/L      eGFR 90.5 mL/min/1.73     Narrative:      GFR Normal >60  Chronic Kidney Disease <60  Kidney Failure <15      Magnesium [[id number]]  (Normal) Collected: 04/23/24 0323    Specimen: Blood Updated: 04/23/24 0549     Magnesium 1.7 mg/dL     CBC & Differential [[id number]]  (Abnormal) Collected: 04/23/24 0323    Specimen: Blood Updated: 04/23/24 0540    Narrative:      The following orders were created for panel order CBC & Differential.  Procedure                               Abnormality         Status                     ---------                               -----------         ------                     CBC Auto Differential[[id number]]        Abnormal            Final result                 Please view results for these tests on the individual orders.    CBC Auto Differential [[id number]]  (Abnormal) Collected: 04/23/24 0323    Specimen: Blood Updated: 04/23/24 0540     WBC 13.39 10*3/mm3      RBC " 4.16 10*6/mm3      Hemoglobin 12.1 g/dL      Hematocrit 37.2 %      MCV 89.4 fL      MCH 29.1 pg      MCHC 32.5 g/dL      RDW 14.1 %      RDW-SD 46.3 fl      MPV 12.2 fL      Platelets 181 10*3/mm3      Neutrophil % 83.3 %      Lymphocyte % 8.3 %      Monocyte % 7.7 %      Eosinophil % 0.0 %      Basophil % 0.1 %      Immature Grans % 0.6 %      Neutrophils, Absolute 11.16 10*3/mm3      Lymphocytes, Absolute 1.11 10*3/mm3      Monocytes, Absolute 1.03 10*3/mm3      Eosinophils, Absolute 0.00 10*3/mm3      Basophils, Absolute 0.01 10*3/mm3      Immature Grans, Absolute 0.08 10*3/mm3      nRBC 0.0 /100 WBC     POC Glucose Once [[id number]]  (Abnormal) Collected: 04/22/24 1631    Specimen: Blood Updated: 04/22/24 1632     Glucose 150 mg/dL     Tissue Pathology Exam [[id number]] Collected: 04/22/24 0915    Specimen: Tissue from Large Intestine, Sigmoid Colon Updated: 04/22/24 1103    Potassium [[id number]]  (Normal) Collected: 04/22/24 0644    Specimen: Blood Updated: 04/22/24 0705     Potassium 3.5 mmol/L             I/O last shift:  I/O this shift:  In: -   Out: 1200 [Urine:1200]       Pathology:  Order Name Source Comment Collection Info Order Time   POTASSIUM  For all patients with renal disease within 7 days, ESRD day of, within 3 days if taking digoxin, potassium-depleting anti-hypertensives or diuretics within 7 days.   Nursing: discontinue this order if not completed in pre-op; there is no need to act on this order once the patient is out of surgery. Collected By: Elizabeth Costa RN 4/22/2024  6:03 AM     Release to patient   Routine Release        BASIC METABOLIC PANEL   Collected By: Charu Villeda 4/22/2024 10:02 PM     Release to patient   Routine Release        MAGNESIUM   Collected By: Charu Villeda 4/22/2024 10:02 PM     Release to patient   Routine Release        TYPE AND SCREEN   Collected By: Elizabeth Costa RN 4/22/2024  6:03 AM     Release to patient   Routine Release        TISSUE PATHOLOGY EXAM Large  Intestine, Sigmoid Colon  Collected By: Tico Morley MD 4/22/2024  9:16 AM     Release to patient   Routine Release        .    Assessment and Plan:  63-year-old female with recurrent diverticulitis now postop day 1 from robotic low anterior resection, splenic flexure mobilization and colorectal anastomosis.  Overall she is doing well still awaiting return of bowel function    Plan  Multimodal pain control  Incentive spirometry  Vitals every 4  Advance to full liquid diet, advance as tolerated to GI soft  DC IV fluids, Gan is out, already voided  Lovenox  to start today    Tico Morley MD  [facility]  04/23/24  06:52 EDT

## 2024-04-24 VITALS
SYSTOLIC BLOOD PRESSURE: 151 MMHG | WEIGHT: 130.95 LBS | HEART RATE: 74 BPM | RESPIRATION RATE: 16 BRPM | DIASTOLIC BLOOD PRESSURE: 85 MMHG | TEMPERATURE: 97.2 F | HEIGHT: 61 IN | OXYGEN SATURATION: 97 % | BODY MASS INDEX: 24.72 KG/M2

## 2024-04-24 LAB
GLUCOSE BLDC GLUCOMTR-MCNC: 96 MG/DL (ref 70–130)
GLUCOSE BLDC GLUCOMTR-MCNC: 97 MG/DL (ref 70–130)

## 2024-04-24 PROCEDURE — 25010000002 MORPHINE PER 10 MG: Performed by: STUDENT IN AN ORGANIZED HEALTH CARE EDUCATION/TRAINING PROGRAM

## 2024-04-24 PROCEDURE — 25010000002 ENOXAPARIN PER 10 MG: Performed by: STUDENT IN AN ORGANIZED HEALTH CARE EDUCATION/TRAINING PROGRAM

## 2024-04-24 PROCEDURE — 63710000001 ONDANSETRON ODT 4 MG TABLET DISPERSIBLE: Performed by: STUDENT IN AN ORGANIZED HEALTH CARE EDUCATION/TRAINING PROGRAM

## 2024-04-24 PROCEDURE — 82948 REAGENT STRIP/BLOOD GLUCOSE: CPT

## 2024-04-24 RX ORDER — OXYCODONE HYDROCHLORIDE 5 MG/1
5 TABLET ORAL EVERY 6 HOURS PRN
Qty: 28 TABLET | Refills: 0 | Status: SHIPPED | OUTPATIENT
Start: 2024-04-24 | End: 2024-05-01

## 2024-04-24 RX ORDER — BACLOFEN 10 MG/1
10 TABLET ORAL 3 TIMES DAILY
Qty: 30 TABLET | Refills: 0 | Status: SHIPPED | OUTPATIENT
Start: 2024-04-24 | End: 2024-05-04

## 2024-04-24 RX ORDER — METHOCARBAMOL 750 MG/1
750 TABLET, FILM COATED ORAL EVERY 8 HOURS SCHEDULED
Qty: 30 TABLET | Refills: 0 | Status: SHIPPED | OUTPATIENT
Start: 2024-04-24 | End: 2024-05-04

## 2024-04-24 RX ORDER — NALOXONE HYDROCHLORIDE 4 MG/.1ML
SPRAY NASAL
Qty: 2 EACH | Refills: 0 | Status: SHIPPED | OUTPATIENT
Start: 2024-04-24

## 2024-04-24 RX ADMIN — OXYCODONE 5 MG: 5 TABLET ORAL at 04:36

## 2024-04-24 RX ADMIN — ONDANSETRON 4 MG: 4 TABLET, ORALLY DISINTEGRATING ORAL at 04:36

## 2024-04-24 RX ADMIN — METHOCARBAMOL 750 MG: 750 TABLET ORAL at 13:30

## 2024-04-24 RX ADMIN — MORPHINE SULFATE 2 MG: 2 INJECTION, SOLUTION INTRAMUSCULAR; INTRAVENOUS at 08:44

## 2024-04-24 RX ADMIN — ENOXAPARIN SODIUM 40 MG: 100 INJECTION SUBCUTANEOUS at 08:39

## 2024-04-24 RX ADMIN — BACLOFEN 10 MG: 10 TABLET ORAL at 08:39

## 2024-04-24 RX ADMIN — PANTOPRAZOLE SODIUM 40 MG: 40 TABLET, DELAYED RELEASE ORAL at 08:39

## 2024-04-24 RX ADMIN — ACETAMINOPHEN 1000 MG: 500 TABLET ORAL at 08:39

## 2024-04-24 RX ADMIN — METHOCARBAMOL 750 MG: 750 TABLET ORAL at 05:03

## 2024-04-24 RX ADMIN — OXYCODONE 5 MG: 5 TABLET ORAL at 15:49

## 2024-04-24 RX ADMIN — BACLOFEN 10 MG: 10 TABLET ORAL at 15:49

## 2024-04-24 NOTE — DISCHARGE SUMMARY
Patient Name: Merle Granados  MRN: [id number]  : 1960  DOS: 2024    Attending: Tico Morley MD    Primary Care Provider: Jakub Triana APRN    Date of Admission:.2024  5:01 AM    Date of Discharge:  2024    Discharge Diagnosis:   S/P colectomy ( Robotic LAR with SFM)    Diverticular disease of colon    Hyperlipidemia      Hospital Course    At admit:    Patient is a pleasant 63 y.o. female presented for scheduled surgery by Dr. Morley.     She has 2 year history of diverticulitis. She had LLQ pain. She had colonoscopy 24 that showed pancolonic diverticulitis.      She had a hospitalization in January for diverticulitis with perforation and abscess. She was on IV abx. No recent fevers, chills or night sweats.       Today she underwent robotic low anterior resection with splenic flexure mobilization, surgery was done under general anesthesia and a block, was tolerated well, she is admitted for the management.     Seen in her room postop, doing well, good pain control, drowsy from not sleeping last night.  No nausea or vomiting and no shortness of breath.    After admit:    She was provided pain medication as needed for pain control.  Adjustments were made to pain medications to optimize postop pain management.   Risks and benefits of opiate medications discussed with patient.  Guilherme report in chart was reviewed prior to discharge.    She received DVT prophylaxis with subcutaneous heparin as well as mechanicals      Once she showed evidence of bowel function she was started on clear liquid diet.    Prior to discharge she tolerated soft diet without difficulty.    She used an IS for atelectasis prophylaxis.    Home medications were resumed as appropriate, and labs were monitored and remained fairly stable.    With the progress she has made, pt is ready for DC home today.      Discussed with patient regarding plan and she shows understanding and agreement.       Procedures  "Performed    2024     Pre-op Diagnosis:   Recurrent complicated diverticulitis        Post-op Diagnosis:    Post-Op Diagnosis Codes:  Same     Procedure(s):  ROBOTIC LOW ANTERIOR RESECTION WITH SPLENIC FLEXURE MOBILIZATION  PROSCTOSCOPY     Surgeon(s):  Tico Morley MD    Pertinent Test Results:    I reviewed the patient's new clinical results.   Results from last 7 days   Lab Units 24  0323   WBC 10*3/mm3 13.39*   HEMOGLOBIN g/dL 12.1   HEMATOCRIT % 37.2   PLATELETS 10*3/mm3 181     Results from last 7 days   Lab Units 24  0323 24  0644   SODIUM mmol/L 135*  --    POTASSIUM mmol/L 3.6 3.5   CHLORIDE mmol/L 100  --    CO2 mmol/L 23.0  --    BUN mg/dL 12  --    CREATININE mg/dL 0.74  --    CALCIUM mg/dL 8.2*  --    GLUCOSE mg/dL 140*  --      I reviewed the patient's new imaging including images and reports.      Discharge Assessment:    Vital Signs  Visit Vitals  /85 (BP Location: Right arm, Patient Position: Sitting)   Pulse 74   Temp 97.2 °F (36.2 °C) (Oral)   Resp 16   Ht 154.9 cm (61\")   Wt 59.4 kg (130 lb 15.3 oz)   SpO2 97%   BMI 24.74 kg/m²     Temp (24hrs), Av.7 °F (36.5 °C), Min:97.2 °F (36.2 °C), Max:98.2 °F (36.8 °C)      General Appearance:    Alert, cooperative, in no acute distress   Lungs:     Clear to auscultation,respirations regular, even and  unlabored    Heart:    Regular rhythm and normal rate, normal S1 and S2    Abdomen:   Soft and benign with clean incisions   Extremities:   Moves all extremities well, no edema, no cyanosis, no redness   Pulses:   Pulses palpable and equal bilaterally   Skin:   No bleeding, bruising or rash          Discharge Disposition: Home        Discharge Medications        New Medications        Instructions Start Date   methocarbamol 750 MG tablet  Commonly known as: ROBAXIN   750 mg, Oral, Every 8 Hours Scheduled      naloxone 4 MG/0.1ML nasal spray  Commonly known as: NARCAN   Call 911. Don't prime. Spray in 1 nostril for " overdose. Repeat in 2-3 minutes in other nostril if no or minimal breathing/responsiveness.      oxyCODONE 5 MG immediate release tablet  Commonly known as: ROXICODONE   5 mg, Oral, Every 6 Hours PRN             Changes to Medications        Instructions Start Date   baclofen 10 MG tablet  Commonly known as: LIORESAL  What changed:   when to take this  reasons to take this   10 mg, Oral, 3 Times Daily             Continue These Medications        Instructions Start Date   ALPRAZolam 0.5 MG tablet  Commonly known as: XANAX   0.5 mg, Oral, 2 Times Daily PRN      ibuprofen 600 MG tablet  Commonly known as: ADVIL,MOTRIN   600 mg, Oral, Every 8 Hours PRN      pantoprazole 40 MG EC tablet  Commonly known as: PROTONIX   40 mg, Oral, Daily      rosuvastatin 10 MG tablet  Commonly known as: CRESTOR   10 mg, Oral, Nightly               Discharge Diet: soft, bland diet   Diet Instructions       Diet: Regular/House Diet; Regular (IDDSI 7); Thin (IDDSI 0)      Discharge Diet: Regular/House Diet    Texture: Regular (IDDSI 7)    Fluid Consistency: Thin (IDDSI 0)            Activity at Discharge: ambulate     Follow-up Appointments  Dr. Morley per his orders    Discharge took over 30 min     VIDA Messina  04/24/24  15:13 EDT

## 2024-04-24 NOTE — CASE MANAGEMENT/SOCIAL WORK
Continued Stay Note  [facility]     Patient Name: Merle Granados  MRN: [id number]  Today's Date: 4/24/2024    Admit Date: 4/22/2024    Plan: home   Discharge Plan       Row Name 04/24/24 1052       Plan    Plan Comments Patient's plan is home at discharge. No discharge needs identified at this time. CM will continue to follow.    Final Discharge Disposition Code 01 - home or self-care                   Discharge Codes    No documentation.                       Aliza Castro RN

## 2024-04-24 NOTE — PROGRESS NOTES
"[facility]    2 Days Post-Op   Length of stay: 2 days    Subjective: Patient is doing fair today.  Having a bit more pain today as her block is wearing off.  Denies any fevers or chills.  She is having bowel function.  Just a tiny amount of blood but only when she wipes and not mixed in with the stool.    Physical Exam:  Blood pressure 151/85, pulse 74, temperature 98.2 °F (36.8 °C), temperature source Oral, resp. rate 16, height 154.9 cm (61\"), weight 59.4 kg (130 lb 15.3 oz), SpO2 97%.    Abd:Soft, appropriately tender, incisions are clean dry and intact.    Labs past 24 hours:  Lab Results (last 24 hours)       Procedure Component Value Units Date/Time    POC Glucose Once [[id number]]  (Normal) Collected: 04/24/24 1130    Specimen: Blood Updated: 04/24/24 1132     Glucose 96 mg/dL     POC Glucose Once [[id number]]  (Normal) Collected: 04/24/24 0721    Specimen: Blood Updated: 04/24/24 0723     Glucose 97 mg/dL     POC Glucose Once [[id number]]  (Normal) Collected: 04/23/24 2008    Specimen: Blood Updated: 04/23/24 2014     Glucose 116 mg/dL     POC Glucose Once [[id number]]  (Normal) Collected: 04/23/24 1613    Specimen: Blood Updated: 04/23/24 1615     Glucose 123 mg/dL             I/O last shift:  No intake/output data recorded.       Pathology:  Order Name Source Comment Collection Info Order Time   POTASSIUM  For all patients with renal disease within 7 days, ESRD day of, within 3 days if taking digoxin, potassium-depleting anti-hypertensives or diuretics within 7 days.   Nursing: discontinue this order if not completed in pre-op; there is no need to act on this order once the patient is out of surgery. Collected By: Elizabeth Costa RN 4/22/2024  6:03 AM     Release to patient   Routine Release        BASIC METABOLIC PANEL   Collected By: Charu Villeda 4/22/2024 10:02 PM     Release to patient   Routine Release        MAGNESIUM   Collected By: Charu Villeda " 4/22/2024 10:02 PM     Release to patient   Routine Release        TYPE AND SCREEN   Collected By: Elizabeth Costa RN 4/22/2024  6:03 AM     Release to patient   Routine Release        TISSUE PATHOLOGY EXAM Large Intestine, Sigmoid Colon  Collected By: Tico Morley MD 4/22/2024  9:16 AM     Release to patient   Routine Release        .    Assessment and Plan:  63-year-old female with recurrent diverticulitis now postop day 2 from robotic low anterior resection, splenic flexure mobilization colorectal anastomosis.  Overall she is doing well.  She is having return of bowel function.  Some issues with pain control but she really has not utilized her oral adjuncts so much now that her block is worn off    Plan  I think we should continue her GI soft diet  I think if she can tolerate her her lunch and her pain is able to be controlled with oral adjuncts, I think it is appropriate for discharge today  If not we will plan to discharge tomorrow morning  Gave her the warning signs and symptoms when she is to call the clinic or return to the hospital  She will follow-up with me on 5/7/2024    Tico Morley MD  [facility]  04/24/24  13:09 EDT

## 2024-04-24 NOTE — PLAN OF CARE
Problem: Adult Inpatient Plan of Care  Goal: Plan of Care Review  4/24/2024 1534 by Kartik Stafford RN  Outcome: Met  4/24/2024 0949 by Kartik Stafford RN  Outcome: Ongoing, Progressing  Flowsheets (Taken 4/24/2024 0949)  Progress: improving  Plan of Care Reviewed With: patient  Goal: Patient-Specific Goal (Individualized)  4/24/2024 1534 by Kartik Stafford RN  Outcome: Met  4/24/2024 0949 by Kartik Stafford RN  Outcome: Ongoing, Progressing  Goal: Absence of Hospital-Acquired Illness or Injury  4/24/2024 1534 by Kartik Stafford RN  Outcome: Met  4/24/2024 0949 by Kartik Stafford RN  Outcome: Ongoing, Progressing  Intervention: Identify and Manage Fall Risk  Recent Flowsheet Documentation  Taken 4/24/2024 1200 by Kartik Stafford RN  Safety Promotion/Fall Prevention:   activity supervised   nonskid shoes/slippers when out of bed   room organization consistent   safety round/check completed  Taken 4/24/2024 1000 by Kartik Stafford RN  Safety Promotion/Fall Prevention:   activity supervised   nonskid shoes/slippers when out of bed   room organization consistent   safety round/check completed  Taken 4/24/2024 0844 by Kartik Stafford RN  Safety Promotion/Fall Prevention:   activity supervised   nonskid shoes/slippers when out of bed   room organization consistent   safety round/check completed  Intervention: Prevent Skin Injury  Recent Flowsheet Documentation  Taken 4/24/2024 1200 by Kartik Stafford RN  Body Position:   position changed independently   weight shifting  Skin Protection:   adhesive use limited   tubing/devices free from skin contact  Taken 4/24/2024 1000 by Kartik Stafford RN  Body Position: position changed independently  Taken 4/24/2024 0844 by Kartik Stafford RN  Body Position:   position changed independently   weight shifting  Skin Protection:   adhesive use limited   tubing/devices free from skin contact  Intervention: Prevent and Manage VTE (Venous Thromboembolism) Risk  Recent Flowsheet  Documentation  Taken 4/24/2024 1200 by Kartik Stafford RN  Activity Management: activity encouraged  Taken 4/24/2024 1000 by Kartik Stafford RN  Activity Management: ambulated in room  Taken 4/24/2024 0844 by Kartik Stafford RN  Activity Management:   activity encouraged   ambulated in room  Taken 4/24/2024 0800 by Kartik Stafford RN  Activity Management: ambulated in room  Intervention: Prevent Infection  Recent Flowsheet Documentation  Taken 4/24/2024 1200 by Kartik Stafford RN  Infection Prevention:   environmental surveillance performed   equipment surfaces disinfected   hand hygiene promoted   personal protective equipment utilized   rest/sleep promoted   single patient room provided  Taken 4/24/2024 1000 by Kartik Stafford RN  Infection Prevention:   environmental surveillance performed   equipment surfaces disinfected   hand hygiene promoted   personal protective equipment utilized   rest/sleep promoted   single patient room provided  Taken 4/24/2024 0844 by Kartik Stafford RN  Infection Prevention:   environmental surveillance performed   equipment surfaces disinfected   hand hygiene promoted   personal protective equipment utilized   rest/sleep promoted   single patient room provided  Goal: Optimal Comfort and Wellbeing  4/24/2024 1534 by Kartik Stafford RN  Outcome: Met  4/24/2024 0949 by Kartik Stafford RN  Outcome: Ongoing, Progressing  Intervention: Monitor Pain and Promote Comfort  Recent Flowsheet Documentation  Taken 4/24/2024 0844 by Kartik Stafford RN  Pain Management Interventions: see MAR  Intervention: Provide Person-Centered Care  Recent Flowsheet Documentation  Taken 4/24/2024 0844 by Kartik Stafford RN  Trust Relationship/Rapport:   care explained   choices provided   emotional support provided   empathic listening provided   questions answered   questions encouraged   reassurance provided   thoughts/feelings acknowledged  Goal: Readiness for Transition of Care  4/24/2024 1534 by Rivera  FERNANDO Verdugo  Outcome: Met  4/24/2024 0949 by Kartik Stafford RN  Outcome: Ongoing, Progressing     Problem: Pain Acute  Goal: Acceptable Pain Control and Functional Ability  4/24/2024 1534 by Kartik Stafford RN  Outcome: Met  4/24/2024 0949 by Kartik Stafford RN  Outcome: Ongoing, Progressing  Intervention: Prevent or Manage Pain  Recent Flowsheet Documentation  Taken 4/24/2024 1200 by Kartik Stafford RN  Sensory Stimulation Regulation:   auditory stimulation minimized   care clustered   lighting decreased   quiet environment promoted   tactile stimulation minimized   visual stimulation minimized  Sleep/Rest Enhancement:   awakenings minimized   relaxation techniques promoted  Medication Review/Management: medications reviewed  Taken 4/24/2024 1000 by Kartik Stafford RN  Medication Review/Management: medications reviewed  Taken 4/24/2024 0844 by Kartik Stafford RN  Sensory Stimulation Regulation:   auditory stimulation minimized   care clustered   quiet environment promoted   tactile stimulation minimized   visual stimulation minimized  Sleep/Rest Enhancement:   awakenings minimized   regular sleep/rest pattern promoted   relaxation techniques promoted  Medication Review/Management: medications reviewed  Intervention: Develop Pain Management Plan  Recent Flowsheet Documentation  Taken 4/24/2024 0844 by Kartik Stafford RN  Pain Management Interventions: see MAR  Intervention: Optimize Psychosocial Wellbeing  Recent Flowsheet Documentation  Taken 4/24/2024 1200 by Kartik Stafford RN  Supportive Measures:   active listening utilized   verbalization of feelings encouraged  Spiritual Activities Assistance: affirmation provided  Taken 4/24/2024 0844 by Kartik Stafford RN  Supportive Measures:   active listening utilized   relaxation techniques promoted   verbalization of feelings encouraged  Spiritual Activities Assistance:   affirmation provided   personal rituals encouraged     Problem: Fall Injury Risk  Goal: Absence of  Fall and Fall-Related Injury  4/24/2024 1534 by Kartik Stafford RN  Outcome: Met  4/24/2024 0949 by Kartik Stafford RN  Outcome: Ongoing, Progressing  Intervention: Identify and Manage Contributors  Recent Flowsheet Documentation  Taken 4/24/2024 1200 by Kartik Stafford RN  Medication Review/Management: medications reviewed  Self-Care Promotion:   BADL personal objects within reach   BADL personal routines maintained   safe use of adaptive equipment encouraged  Taken 4/24/2024 1000 by Kartik Stafford RN  Medication Review/Management: medications reviewed  Self-Care Promotion:   BADL personal objects within reach   BADL personal routines maintained   safe use of adaptive equipment encouraged  Taken 4/24/2024 0844 by Kartik Stafford RN  Medication Review/Management: medications reviewed  Self-Care Promotion:   BADL personal objects within reach   BADL personal routines maintained   safe use of adaptive equipment encouraged  Intervention: Promote Injury-Free Environment  Recent Flowsheet Documentation  Taken 4/24/2024 1200 by Kartik Stafford RN  Safety Promotion/Fall Prevention:   activity supervised   nonskid shoes/slippers when out of bed   room organization consistent   safety round/check completed  Taken 4/24/2024 1000 by Kartik Stafford RN  Safety Promotion/Fall Prevention:   activity supervised   nonskid shoes/slippers when out of bed   room organization consistent   safety round/check completed  Taken 4/24/2024 0844 by Kartik Stafford RN  Safety Promotion/Fall Prevention:   activity supervised   nonskid shoes/slippers when out of bed   room organization consistent   safety round/check completed     Problem: Infection  Goal: Absence of Infection Signs and Symptoms  4/24/2024 1534 by Kartik Stafford RN  Outcome: Met  4/24/2024 0949 by Kartik Stafford RN  Outcome: Ongoing, Progressing  Intervention: Prevent or Manage Infection  Recent Flowsheet Documentation  Taken 4/24/2024 0844 by Kartik Stafford RN  Fever  Reduction/Comfort Measures:   lightweight bedding   lightweight clothing   Goal Outcome Evaluation:  Plan of Care Reviewed With: patient        Progress: improving

## 2024-04-24 NOTE — DISCHARGE INSTR - ACTIVITY
No lifting greater than 20lbs until cleared by MD on follow up appointment   You may shower but no tub baths.

## 2024-04-24 NOTE — PLAN OF CARE
Problem: Adult Inpatient Plan of Care  Goal: Plan of Care Review  Outcome: Ongoing, Progressing  Flowsheets (Taken 4/24/2024 0949)  Progress: improving  Plan of Care Reviewed With: patient  Goal: Patient-Specific Goal (Individualized)  Outcome: Ongoing, Progressing  Goal: Absence of Hospital-Acquired Illness or Injury  Outcome: Ongoing, Progressing  Intervention: Identify and Manage Fall Risk  Recent Flowsheet Documentation  Taken 4/24/2024 0844 by Kartik Stafford RN  Safety Promotion/Fall Prevention:   activity supervised   nonskid shoes/slippers when out of bed   room organization consistent   safety round/check completed  Intervention: Prevent Skin Injury  Recent Flowsheet Documentation  Taken 4/24/2024 0844 by Kartik Stafford RN  Body Position:   position changed independently   weight shifting  Skin Protection:   adhesive use limited   tubing/devices free from skin contact  Intervention: Prevent and Manage VTE (Venous Thromboembolism) Risk  Recent Flowsheet Documentation  Taken 4/24/2024 0844 by Kartik Stafford RN  Activity Management:   activity encouraged   ambulated in room  Taken 4/24/2024 0800 by Kartik Stafford RN  Activity Management: ambulated in room  Intervention: Prevent Infection  Recent Flowsheet Documentation  Taken 4/24/2024 0844 by Kartik Stafford RN  Infection Prevention:   environmental surveillance performed   equipment surfaces disinfected   hand hygiene promoted   personal protective equipment utilized   rest/sleep promoted   single patient room provided  Goal: Optimal Comfort and Wellbeing  Outcome: Ongoing, Progressing  Intervention: Monitor Pain and Promote Comfort  Recent Flowsheet Documentation  Taken 4/24/2024 0844 by Kartik Stafford RN  Pain Management Interventions: see MAR  Intervention: Provide Person-Centered Care  Recent Flowsheet Documentation  Taken 4/24/2024 0844 by Kartik Stafford RN  Trust Relationship/Rapport:   care explained   choices provided   emotional support  provided   empathic listening provided   questions answered   questions encouraged   reassurance provided   thoughts/feelings acknowledged  Goal: Readiness for Transition of Care  Outcome: Ongoing, Progressing     Problem: Pain Acute  Goal: Acceptable Pain Control and Functional Ability  Outcome: Ongoing, Progressing  Intervention: Prevent or Manage Pain  Recent Flowsheet Documentation  Taken 4/24/2024 0844 by Kartik Stafford RN  Sensory Stimulation Regulation:   auditory stimulation minimized   care clustered   quiet environment promoted   tactile stimulation minimized   visual stimulation minimized  Sleep/Rest Enhancement:   awakenings minimized   regular sleep/rest pattern promoted   relaxation techniques promoted  Medication Review/Management: medications reviewed  Intervention: Develop Pain Management Plan  Recent Flowsheet Documentation  Taken 4/24/2024 0844 by Kartik Stafford RN  Pain Management Interventions: see MAR  Intervention: Optimize Psychosocial Wellbeing  Recent Flowsheet Documentation  Taken 4/24/2024 0844 by Kartik Stafford RN  Supportive Measures:   active listening utilized   relaxation techniques promoted   verbalization of feelings encouraged  Spiritual Activities Assistance:   affirmation provided   personal rituals encouraged     Problem: Fall Injury Risk  Goal: Absence of Fall and Fall-Related Injury  Outcome: Ongoing, Progressing  Intervention: Identify and Manage Contributors  Recent Flowsheet Documentation  Taken 4/24/2024 0844 by Kartik Stafford RN  Medication Review/Management: medications reviewed  Self-Care Promotion:   BADL personal objects within reach   BADL personal routines maintained   safe use of adaptive equipment encouraged  Intervention: Promote Injury-Free Environment  Recent Flowsheet Documentation  Taken 4/24/2024 0844 by Kartik Stafford RN  Safety Promotion/Fall Prevention:   activity supervised   nonskid shoes/slippers when out of bed   room organization consistent    safety round/check completed     Problem: Infection  Goal: Absence of Infection Signs and Symptoms  Outcome: Ongoing, Progressing  Intervention: Prevent or Manage Infection  Recent Flowsheet Documentation  Taken 4/24/2024 7710 by Kartik Stafford, RN  Fever Reduction/Comfort Measures:   lightweight bedding   lightweight clothing   Goal Outcome Evaluation:  Plan of Care Reviewed With: patient        Progress: improving

## 2024-04-25 NOTE — PAYOR COMM NOTE
"[facility]# [id number]   24 Discharge Summary    Utilization Review  Phone [phone]  Fax [phone]    [place] [address]         Merle Granados (63 y.o. Female)       Date of Birth   1960    Social Security Number       Address   [address] [place] [address]    Home Phone   [phone]    MRN   [id number]       Scientology   Other    Marital Status                               Admission Date   24    Admission Type   Elective    Admitting Provider   Tico Morley MD    Attending Provider       Department, Room/Bed   [address], S[address]/1       Discharge Date   2024    Discharge Disposition   Home or Self Care    Discharge Destination                                 Attending Provider: (none)   Allergies: No Known Allergies    Isolation: None   Infection: None   Code Status: Prior    Ht: 154.9 cm (61\")   Wt: 59.4 kg (130 lb 15.3 oz)    Admission Cmt: None   Principal Problem: S/P colectomy ( Robotic LAR with SFM) [Z90.49]                   Active Insurance as of 2024       Primary Coverage       Payor Plan Insurance Group Employer/Plan Group    [place] [id number]       Payor Plan Address Payor Plan Phone Number Payor Plan Fax Number Effective Dates    [employer] [id number] [phone]  2021 - None Entered    [place] [id number]         Subscriber Name Subscriber Birth Date Member ID       MERLE GRANADOS 1960 [id number]                     Emergency Contacts        (Rel.) Home Phone Work Phone Mobile Phone    Spencer Granados (Spouse) [phone] -- [phone]                 Discharge Summary        sEtela Prince APRN at 24 1225          Patient Name: Merle Granados  MRN: [id number]  : 1960  DOS: 2024    Attending: Tico Morley MD    Primary Care Provider: Jakub Triana, VIDA    Date of Admission:.2024 "  5:01 AM    Date of Discharge:  4/24/2024    Discharge Diagnosis:   S/P colectomy ( Robotic LAR with SFM)    Diverticular disease of colon    Hyperlipidemia      Hospital Course    At admit:    Patient is a pleasant 63 y.o. female presented for scheduled surgery by Dr. Morley.     She has 2 year history of diverticulitis. She had LLQ pain. She had colonoscopy 2/14/24 that showed pancolonic diverticulitis.      She had a hospitalization in January for diverticulitis with perforation and abscess. She was on IV abx. No recent fevers, chills or night sweats.       Today she underwent robotic low anterior resection with splenic flexure mobilization, surgery was done under general anesthesia and a block, was tolerated well, she is admitted for the management.     Seen in her room postop, doing well, good pain control, drowsy from not sleeping last night.  No nausea or vomiting and no shortness of breath.    After admit:    She was provided pain medication as needed for pain control.  Adjustments were made to pain medications to optimize postop pain management.   Risks and benefits of opiate medications discussed with patient.  Guilherme report in chart was reviewed prior to discharge.    She received DVT prophylaxis with subcutaneous heparin as well as mechanicals      Once she showed evidence of bowel function she was started on clear liquid diet.    Prior to discharge she tolerated soft diet without difficulty.    She used an IS for atelectasis prophylaxis.    Home medications were resumed as appropriate, and labs were monitored and remained fairly stable.    With the progress she has made, pt is ready for DC home today.      Discussed with patient regarding plan and she shows understanding and agreement.       Procedures Performed    4/22/2024     Pre-op Diagnosis:   Recurrent complicated diverticulitis        Post-op Diagnosis:    Post-Op Diagnosis Codes:  Same     Procedure(s):  ROBOTIC LOW ANTERIOR RESECTION WITH  "SPLENIC FLEXURE MOBILIZATION  PROSCTOSCOPY     Surgeon(s):  Tico Morley MD    Pertinent Test Results:    I reviewed the patient's new clinical results.   Results from last 7 days   Lab Units 24  0323   WBC 10*3/mm3 13.39*   HEMOGLOBIN g/dL 12.1   HEMATOCRIT % 37.2   PLATELETS 10*3/mm3 181     Results from last 7 days   Lab Units 24  0323 24  0644   SODIUM mmol/L 135*  --    POTASSIUM mmol/L 3.6 3.5   CHLORIDE mmol/L 100  --    CO2 mmol/L 23.0  --    BUN mg/dL 12  --    CREATININE mg/dL 0.74  --    CALCIUM mg/dL 8.2*  --    GLUCOSE mg/dL 140*  --      I reviewed the patient's new imaging including images and reports.      Discharge Assessment:    Vital Signs  Visit Vitals  /85 (BP Location: Right arm, Patient Position: Sitting)   Pulse 74   Temp 97.2 °F (36.2 °C) (Oral)   Resp 16   Ht 154.9 cm (61\")   Wt 59.4 kg (130 lb 15.3 oz)   SpO2 97%   BMI 24.74 kg/m²     Temp (24hrs), Av.7 °F (36.5 °C), Min:97.2 °F (36.2 °C), Max:98.2 °F (36.8 °C)      General Appearance:    Alert, cooperative, in no acute distress   Lungs:     Clear to auscultation,respirations regular, even and  unlabored    Heart:    Regular rhythm and normal rate, normal S1 and S2    Abdomen:   Soft and benign with clean incisions   Extremities:   Moves all extremities well, no edema, no cyanosis, no redness   Pulses:   Pulses palpable and equal bilaterally   Skin:   No bleeding, bruising or rash          Discharge Disposition: Home        Discharge Medications        New Medications        Instructions Start Date   methocarbamol 750 MG tablet  Commonly known as: ROBAXIN   750 mg, Oral, Every 8 Hours Scheduled      naloxone 4 MG/0.1ML nasal spray  Commonly known as: NARCAN   Call 911. Don't prime. Springer in 1 nostril for overdose. Repeat in 2-3 minutes in other nostril if no or minimal breathing/responsiveness.      oxyCODONE 5 MG immediate release tablet  Commonly known as: ROXICODONE   5 mg, Oral, Every 6 Hours PRN    "          Changes to Medications        Instructions Start Date   baclofen 10 MG tablet  Commonly known as: LIORESAL  What changed:   when to take this  reasons to take this   10 mg, Oral, 3 Times Daily             Continue These Medications        Instructions Start Date   ALPRAZolam 0.5 MG tablet  Commonly known as: XANAX   0.5 mg, Oral, 2 Times Daily PRN      ibuprofen 600 MG tablet  Commonly known as: ADVIL,MOTRIN   600 mg, Oral, Every 8 Hours PRN      pantoprazole 40 MG EC tablet  Commonly known as: PROTONIX   40 mg, Oral, Daily      rosuvastatin 10 MG tablet  Commonly known as: CRESTOR   10 mg, Oral, Nightly               Discharge Diet: soft, bland diet   Diet Instructions       Diet: Regular/House Diet; Regular (IDDSI 7); Thin (IDDSI 0)      Discharge Diet: Regular/House Diet    Texture: Regular (IDDSI 7)    Fluid Consistency: Thin (IDDSI 0)            Activity at Discharge: ambulate     Follow-up Appointments  Dr. Grajeda per his orders    Discharge took over 30 min     VIDA Messina  04/24/24  15:13 EDT    Electronically signed by Estela Prince APRN at 04/24/24 1513       Discharge Order (From admission, onward)       Start     Ordered    04/24/24 1514  Discharge patient  Once        Expected Discharge Date: 04/24/24   Discharge Disposition: Home or Self Care   Physician of Record for Attribution - Please select from Treatment Team: ELICIA GRAJEDA [[id number]]   Review needed by CMO to determine Physician of Record: No      Question Answer Comment   Physician of Record for Attribution - Please select from Treatment Team ELICIA GRAJEDA    Review needed by CMO to determine Physician of Record No        04/24/24 1513    04/24/24 1255  Discharge patient  Once,   Status:  Canceled        Comments: IF tolerates lunch   Expected Discharge Date: 04/24/24   Discharge Disposition: Home or Self Care   Physician of Record for Attribution - Please select from Treatment Team: ELICIA GRAJEDA  [[id number]]   Review needed by CMO to determine Physician of Record: No      Question Answer Comment   Physician of Record for Attribution - Please select from Treatment Team ELICIA GRAJEDA    Review needed by CMO to determine Physician of Record No        04/24/24 1659

## 2025-05-13 ENCOUNTER — TELEPHONE (OUTPATIENT)
Dept: GASTROENTEROLOGY | Facility: CLINIC | Age: 65
End: 2025-05-13
Payer: MEDICAID

## 2025-05-13 NOTE — TELEPHONE ENCOUNTER
Hub staff attempted to follow warm transfer process and was unsuccessful     Caller: Merle Granados    Relationship to patient: Self    Best call back number: [phone]    Patient is needing: PT IS CALLING BECAUSE SHE IS HAVING SOME ISSUES THAT SHE NEEDS TO SPEAK WITH MA ABOUT. PLEASE GIVE PT A CALL BACK AND IF NOT ABLE TO REACH PT.IT IS OKAY TO LVM.

## 2025-05-19 NOTE — TELEPHONE ENCOUNTER
CALLED PT TO SCHEDULE FOLLOW UP APPT. PT STATED SHE IS DOING MUCH BETTER AND WOULD CALL BACK TO SCHEDULE WHEN NEEDED.

## (undated) DEVICE — TRENDELENBURG WINGPAD POSITIONING KIT DELUXE - WITHOUT BODY STRAP: Brand: SOULE MEDICAL

## (undated) DEVICE — BLANKT WARM UPPR/BDY ARM/OUT 57X196CM

## (undated) DEVICE — TUBING, SUCTION, 1/4" X 10', STRAIGHT: Brand: MEDLINE

## (undated) DEVICE — KT WARM LAP LIQUIDSCOPE/HELPOR W/WARMOR/CLTH 2/TROC/SWAB

## (undated) DEVICE — ENDOGATOR TUBING FOR ENDOGATOR EGP-100 IRRIGATION PUMP,OLYMPUS OFP PUMP, OLYMPUS AFU-100 PUMP AND ERBE EIP2 PUMP: Brand: ENDOGATOR

## (undated) DEVICE — BLADELESS OBTURATOR: Brand: WECK VISTA

## (undated) DEVICE — ST TBG AIRSEAL FLTR TRI LUM

## (undated) DEVICE — ENDOGATOR AUXILIARY WATER JET CONNECTOR: Brand: ENDOGATOR

## (undated) DEVICE — CVR HNDL LIGHT RIGID

## (undated) DEVICE — SUT MNCRYL PLS ANTIB UD 4/0 PS2 18IN

## (undated) DEVICE — WOUND RETRACTOR AND PROTECTOR: Brand: ALEXIS O WOUND PROTECTOR-RETRACTOR

## (undated) DEVICE — COLUMN DRAPE

## (undated) DEVICE — STPLR TRAIN 60 SUREFORM DAVINCI/X/XI

## (undated) DEVICE — SEAL

## (undated) DEVICE — LAPAROSCOPY WOUND CLOSURE SYSTEM KIT CONSISTS OF:05391529640002; NEOCLOSE ANCHORGUIDE 5/12 & 8/15 US; MODEL NUMBER NCA515-U; QTY 10 AND05391529640019; NEOCLOSE AUTOANCHOR X2 PACK US; MODEL NUMBER NCA2-U;  QTY 10: Brand: NEOCLOSE ANCHORGUIDE REGULAR PORT CLOSURE KIT US

## (undated) DEVICE — TOTAL TRAY, 16FR 10ML SIL FOLEY, URN: Brand: MEDLINE

## (undated) DEVICE — VESSEL SEALER EXTEND: Brand: ENDOWRIST

## (undated) DEVICE — REDUCER: Brand: ENDOWRIST

## (undated) DEVICE — PK UROL DAVINCI 10

## (undated) DEVICE — TOWEL,OR,DSP,ST,BLUE,STD,4/PK,20PK/CS: Brand: MEDLINE

## (undated) DEVICE — ADHS SKIN PREMIERPRO EXOFIN TOPICAL HI/VISC .5ML

## (undated) DEVICE — Device

## (undated) DEVICE — TBG PENCL TELESCP MEGADYNE SMOKE EVAC 10FT

## (undated) DEVICE — SAFESECURE,SECUREMENT,FOLEY CATH,STERILE: Brand: MEDLINE

## (undated) DEVICE — Device: Brand: DEFENDO AIR/WATER/SUCTION AND BIOPSY VALVE

## (undated) DEVICE — TIP COVER ACCESSORY

## (undated) DEVICE — SUT VIC 12X27 D8116 BX/12

## (undated) DEVICE — ANTIBACTERIAL UNDYED BRAIDED (POLYGLACTIN 910), SYNTHETIC ABSORBABLE SUTURE: Brand: COATED VICRYL

## (undated) DEVICE — ARM DRAPE

## (undated) DEVICE — MEDI-VAC YANKAUER SUCTION HANDLE W/BULBOUS TIP: Brand: CARDINAL HEALTH

## (undated) DEVICE — LAPAROVUE VISIBILITY SYSTEM LAPAROSCOPIC SOLUTIONS: Brand: LAPAROVUE

## (undated) DEVICE — LEGGINGS, PAIR, 29X43, STERILE: Brand: MEDLINE

## (undated) DEVICE — GLV SURG SENSICARE PI MIC PF SZ7.5 LF STRL

## (undated) DEVICE — SUT PROLN 2/0 PC3 8833H

## (undated) DEVICE — PATIENT RETURN ELECTRODE, SINGLE-USE, CONTACT QUALITY MONITORING, ADULT, WITH 9FT CORD, FOR PATIENTS WEIGING OVER 33LBS. (15KG): Brand: MEGADYNE

## (undated) DEVICE — CONN Y IRR DISP 1P/U

## (undated) DEVICE — JELLY,LUBE,STERILE,FLIP TOP,TUBE,2-OZ: Brand: MEDLINE

## (undated) DEVICE — SPNG LAP PREWSH SFTPK 18X18IN STRL PK/5

## (undated) DEVICE — SUT PDS CLOSURE CT1 1/0 27IN Z341H

## (undated) DEVICE — COVER,LIGHT HANDLE,FLX,1/PK: Brand: MEDLINE INDUSTRIES, INC.